# Patient Record
Sex: FEMALE | Race: BLACK OR AFRICAN AMERICAN | Employment: FULL TIME | URBAN - METROPOLITAN AREA
[De-identification: names, ages, dates, MRNs, and addresses within clinical notes are randomized per-mention and may not be internally consistent; named-entity substitution may affect disease eponyms.]

---

## 2017-02-02 ENCOUNTER — HOSPITAL ENCOUNTER (OUTPATIENT)
Dept: MRI IMAGING | Age: 52
Discharge: HOME | End: 2017-02-02
Attending: PHYSICAL MEDICINE & REHABILITATION
Payer: COMMERCIAL

## 2017-02-02 DIAGNOSIS — M54.17 LUMBOSACRAL NEURITIS: ICD-10-CM

## 2017-02-02 DIAGNOSIS — M54.16 LUMBAR RADICULOPATHY: ICD-10-CM

## 2017-02-02 PROCEDURE — 72148 MRI LUMBAR SPINE W/O DYE: CPT

## 2017-02-15 ENCOUNTER — PATIENT OUTREACH (OUTPATIENT)
Dept: FAMILY MEDICINE CLINIC | Age: 52
End: 2017-02-15

## 2017-02-15 NOTE — PROGRESS NOTES
This episode closed:NNT[facility]IP CJW 12/18-19/2016: Chest Pain. Patient attended appointment with PCP 12/28/2017. Patient attended appointment with Cardiologist and completed Holter monitor with Malissa Cuba MD. Patient does not have return appointment unless future problems. Completed potassium regimen. Patient will follow up with Dr. Jaime Quintanilla 3/17/2017. Patient continues with Physical Therapy and is improving ( no back pain). Patient feels like she can go to the gym again. Patient stated eating healthier. Patient will go to the [facility] and look for recipes. Patient denied no chest pain,SOB,unusual sensations. She noted one event of palpitations while on the Holter monitor. NN reviewed RED FLAGS with patient and she verbalized understanding when to seek immediate medical attention. Red Flags: Fever,chills,Nausea,Vomiting,Diarrhea, unable to take medications,pain,SOB,chest pain,unusual sensations. Results for Delta [place]an (MRN [id number]) as of 2/15/2017 15:15   Ref.  Range 12/28/2016 15:04   Sodium Latest Ref Range: 134 - 144 mmol/L 141   Potassium Latest Ref Range: 3.5 - 5.2 mmol/L 3.9   Chloride Latest Ref Range: 96 - 106 mmol/L 103   CO2 Latest Ref Range: 18 - 29 mmol/L 26   Glucose Latest Ref Range: 65 - 99 mg/dL 89   BUN Latest Ref Range: 6 - 24 mg/dL 10   Creatinine Latest Ref Range: 0.57 - 1.00 mg/dL 0.58   BUN/Creatinine ratio Latest Ref Range: 9 - 23  17   Calcium Latest Ref Range: 8.7 - 10.2 mg/dL 8.8   GFR est non-AA Latest Ref Range: >59 mL/min/1.73 108   GFR est AA Latest Ref Range: >59 mL/min/1.73 124

## 2017-02-15 NOTE — PATIENT INSTRUCTIONS
Dr. Rosalio Braun 3/17/2017. Heart-Healthy Diet: Care Instructions  Your Care Instructions    A heart-healthy diet has lots of vegetables, fruits, nuts, beans, and whole grains, and is low in salt. It limits foods that are high in saturated fat, such as meats, cheeses, and fried foods. It may be hard to change your diet, but even small changes can lower your risk of heart attack and heart disease. Follow-up care is a key part of your treatment and safety. Be sure to make and go to all appointments, and call your doctor if you are having problems. It's also a good idea to know your test results and keep a list of the medicines you take. How can you care for yourself at home? Watch your portions  · Learn what a serving is. A \"serving\" and a \"portion\" are not always the same thing. Make sure that you are not eating larger portions than are recommended. For example, a serving of pasta is ½ cup. A serving size of meat is 2 to 3 ounces. A 3-ounce serving is about the size of a deck of cards. Measure serving sizes until you are good at [place]" them. Keep in mind that restaurants often serve portions that are 2 or 3 times the size of one serving. · To keep your energy level up and keep you from feeling hungry, eat often but in smaller portions. · Eat only the number of calories you need to stay at a healthy weight. If you need to lose weight, eat fewer calories than your body burns (through exercise and other physical activity). Eat more fruits and vegetables  · Eat a variety of fruit and vegetables every day. Dark green, deep orange, red, or yellow fruits and vegetables are especially good for you. Examples include spinach, carrots, peaches, and berries. · Keep carrots, celery, and other veggies handy for snacks. Buy fruit that is in season and store it where you can see it so that you will be tempted to eat it. · Cook dishes that have a lot of veggies in them, such as stir-fries and soups.   Limit saturated and trans fat  · Read food labels, and try to avoid saturated and trans fats. They increase your risk of heart disease. Trans fat is found in many processed foods such as cookies and crackers. · Use olive or canola oil when you cook. Try cholesterol-lowering spreads, such as Benecol or Take Control. · Bake, broil, grill, or steam foods instead of frying them. · Choose lean meats instead of high-fat meats such as hot dogs and sausages. Cut off all visible fat when you prepare meat. · Eat fish, skinless poultry, and meat alternatives such as soy products instead of high-fat meats. Soy products, such as tofu, may be especially good for your heart. · Choose low-fat or fat-free milk and dairy products. Eat fish  · Eat at least two servings of fish a week. Certain fish, such as salmon and tuna, contain omega-3 fatty acids, which may help reduce your risk of heart attack. Eat foods high in fiber  · Eat a variety of grain products every day. Include whole-grain foods that have lots of fiber and nutrients. Examples of whole-grain foods include oats, whole wheat bread, and brown rice. · Buy whole-grain breads and cereals, instead of white bread or pastries. Limit salt and sodium  · Limit how much salt and sodium you eat to help lower your blood pressure. · Taste food before you salt it. Add only a little salt when you think you need it. With time, your taste buds will adjust to less salt. · Eat fewer snack items, fast foods, and other high-salt, processed foods. Check food labels for the amount of sodium in packaged foods. · Choose low-sodium versions of canned goods (such as soups, vegetables, and beans). Limit sugar  · Limit drinks and foods with added sugar. These include candy, desserts, and soda pop. Limit alcohol  · Limit alcohol to no more than 2 drinks a day for men and 1 drink a day for women. Too much alcohol can cause health problems. When should you call for help?   Watch closely for changes in your health, and be sure to contact your doctor if:  · You would like help planning heart-healthy meals. Where can you learn more? Go to http://zohreh-reynold.info/. Enter V137 in the search box to learn more about \"Heart-Healthy Diet: Care Instructions. \"  Current as of: January 27, 2016  Content Version: 11.1  © 8724-2231 [employer]. Care instructions adapted under license by [employer] (which disclaims liability or warranty for this information). If you have questions about a medical condition or this instruction, always ask your healthcare professional. [place] [address] any warranty or liability for your use of this information. Heart-Healthy Diet: Care Instructions  Your Care Instructions    A heart-healthy diet has lots of vegetables, fruits, nuts, beans, and whole grains, and is low in salt. It limits foods that are high in saturated fat, such as meats, cheeses, and fried foods. It may be hard to change your diet, but even small changes can lower your risk of heart attack and heart disease. Follow-up care is a key part of your treatment and safety. Be sure to make and go to all appointments, and call your doctor if you are having problems. It's also a good idea to know your test results and keep a list of the medicines you take. How can you care for yourself at home? Watch your portions  · Learn what a serving is. A \"serving\" and a \"portion\" are not always the same thing. Make sure that you are not eating larger portions than are recommended. For example, a serving of pasta is ½ cup. A serving size of meat is 2 to 3 ounces. A 3-ounce serving is about the size of a deck of cards. Measure serving sizes until you are good at [place]" them. Keep in mind that restaurants often serve portions that are 2 or 3 times the size of one serving. · To keep your energy level up and keep you from feeling hungry, eat often but in smaller portions.   · Eat only the number of calories you need to stay at a healthy weight. If you need to lose weight, eat fewer calories than your body burns (through exercise and other physical activity). Eat more fruits and vegetables  · Eat a variety of fruit and vegetables every day. Dark green, deep orange, red, or yellow fruits and vegetables are especially good for you. Examples include spinach, carrots, peaches, and berries. · Keep carrots, celery, and other veggies handy for snacks. Buy fruit that is in season and store it where you can see it so that you will be tempted to eat it. · Cook dishes that have a lot of veggies in them, such as stir-fries and soups. Limit saturated and trans fat  · Read food labels, and try to avoid saturated and trans fats. They increase your risk of heart disease. Trans fat is found in many processed foods such as cookies and crackers. · Use olive or canola oil when you cook. Try cholesterol-lowering spreads, such as Benecol or Take Control. · Bake, broil, grill, or steam foods instead of frying them. · Choose lean meats instead of high-fat meats such as hot dogs and sausages. Cut off all visible fat when you prepare meat. · Eat fish, skinless poultry, and meat alternatives such as soy products instead of high-fat meats. Soy products, such as tofu, may be especially good for your heart. · Choose low-fat or fat-free milk and dairy products. Eat fish  · Eat at least two servings of fish a week. Certain fish, such as salmon and tuna, contain omega-3 fatty acids, which may help reduce your risk of heart attack. Eat foods high in fiber  · Eat a variety of grain products every day. Include whole-grain foods that have lots of fiber and nutrients. Examples of whole-grain foods include oats, whole wheat bread, and brown rice. · Buy whole-grain breads and cereals, instead of white bread or pastries.   Limit salt and sodium  · Limit how much salt and sodium you eat to help lower your blood pressure. · Taste food before you salt it. Add only a little salt when you think you need it. With time, your taste buds will adjust to less salt. · Eat fewer snack items, fast foods, and other high-salt, processed foods. Check food labels for the amount of sodium in packaged foods. · Choose low-sodium versions of canned goods (such as soups, vegetables, and beans). Limit sugar  · Limit drinks and foods with added sugar. These include candy, desserts, and soda pop. Limit alcohol  · Limit alcohol to no more than 2 drinks a day for men and 1 drink a day for women. Too much alcohol can cause health problems. When should you call for help? Watch closely for changes in your health, and be sure to contact your doctor if:  · You would like help planning heart-healthy meals. Where can you learn more? Go to http://zohreh-reynold.info/. Enter V137 in the search box to learn more about \"Heart-Healthy Diet: Care Instructions. \"  Current as of: January 27, 2016  Content Version: 11.1  © 1695-3780 [employer]. Care instructions adapted under license by [employer] (which disclaims liability or warranty for this information). If you have questions about a medical condition or this instruction, always ask your healthcare professional. [address] any warranty or liability for your use of this information.

## 2017-04-12 ENCOUNTER — OFFICE VISIT (OUTPATIENT)
Dept: FAMILY MEDICINE CLINIC | Age: 52
End: 2017-04-12

## 2017-04-12 VITALS
OXYGEN SATURATION: 96 % | HEIGHT: 63 IN | TEMPERATURE: 98 F | BODY MASS INDEX: 40.93 KG/M2 | HEART RATE: 75 BPM | WEIGHT: 231 LBS | SYSTOLIC BLOOD PRESSURE: 107 MMHG | RESPIRATION RATE: 16 BRPM | DIASTOLIC BLOOD PRESSURE: 71 MMHG

## 2017-04-12 DIAGNOSIS — Z01.419 WELL WOMAN EXAM WITH ROUTINE GYNECOLOGICAL EXAM: Primary | ICD-10-CM

## 2017-04-12 DIAGNOSIS — I10 ESSENTIAL HYPERTENSION: ICD-10-CM

## 2017-04-12 DIAGNOSIS — R79.89 LOW VITAMIN D LEVEL: ICD-10-CM

## 2017-04-12 NOTE — PROGRESS NOTES
HPI:  Shima Easley is a 49 y.o. female presenting for well woman exam.     Last menstrual period was: doesn't have, has already went through menopause     Sexually active?: yes  Diet: doesn't eat like she should she admits  Exercise: none but has gym membership       Allergies- reviewed: Allergies   Allergen Reactions    Latex Itching    Atorvastatin Myalgia    Crestor [Rosuvastatin] Myalgia         Medications- reviewed:   Current Outpatient Prescriptions   Medication Sig    famotidine (PEPCID) 20 mg tablet Take 1 Tab by mouth nightly.  hydroCHLOROthiazide (HYDRODIURIL) 25 mg tablet TAKE ONE TABLET BY MOUTH ONCE DAILY    aspirin delayed-release 81 mg tablet Take  by mouth daily.  potassium chloride SA (MICRO-K) 10 mEq capsule Take 20 mEq by mouth daily.  fish oil-omega-3 fatty acids 340-1,000 mg capsule Take 1 Cap by mouth daily.  OTHER Liver rescue    digestive enzymes tab Take  by mouth. No current facility-administered medications for this visit. Past Medical History- reviewed:  Past Medical History:   Diagnosis Date    Hypertension          Past Surgical History- reviewed:   Past Surgical History:   Procedure Laterality Date    HX GYN      Laproscopy D &C         Family History - reviewed:  Family History   Problem Relation Age of Onset    Diabetes Mother     Heart Disease Father     Diabetes Sister     Heart Disease Sister     Stroke Sister     Stroke Brother          Social History - reviewed:  Social History     Social History    Marital status: SINGLE     Spouse name: N/A    Number of children: N/A    Years of education: N/A     Occupational History    Not on file.      Social History Main Topics    Smoking status: Never Smoker    Smokeless tobacco: Never Used    Alcohol use No    Drug use: No    Sexual activity: Not Currently     Other Topics Concern    Not on file     Social History Narrative         Immunizations - reviewed:   Immunization History Administered Date(s) Administered    Tdap 04/12/2017       Tdap: today       Health Maintenance reviewed -  Pap smear today  Mammogram today  Colonoscopy 5 years ago, thinks she was told to come back in 10 years   DEXA scan N/A  HIV testing desires  Hepatitis C testing desires      Review of Systems   CONSTITUTIONAL: Denies: fever, chills  EYES: decreased vision  ENT: Denies: sore throat, nasal congestion, nasal discharge  CARDIOVASCULAR: Denies: Denies: chest pain, dyspnea on exertion, palpitations  RESPIRATORY: Denies: shortness of breath, pleuritic chest pain  GI: Denies: nausea, vomiting, diarrhea  : Denies: dysuria, frequency/urgency  BREASTS: No masses or nipple discharge      Physical Exam  Visit Vitals    /71 (BP 1 Location: Left arm, BP Patient Position: Sitting)    Pulse 75    Temp 98 °F (36.7 °C) (Oral)    Resp 16    Ht 5' 2.5\" (1.588 m)    Wt 231 lb (104.8 kg)    SpO2 96%    BMI 41.58 kg/m2       General appearance - alert, well appearing, and in no distress  Eyes - pupils equal and reactive, extraocular eye movements intact  Ears - bilateral TM's and external ear canals normal  Nose - normal and patent, no erythema, discharge or polyps  Mouth - mucous membranes moist, pharynx normal without lesions  Neck - supple, no significant adenopathy  Chest - clear to auscultation, no wheezes, rales or rhonchi, symmetric air entry  Heart - normal rate, regular rhythm, normal S1, S2, no murmurs, rubs, clicks or gallops  Abdomen - soft, nontender, nondistended, no masses or organomegaly, limited by obesity   Neurological - alert, oriented, normal speech, no focal findings or movement disorder noted  Musculoskeletal - no joint tenderness, deformity or swelling  Extremities - peripheral pulses normal, no pedal edema, no clubbing or cyanosis  Skin - large black head R breast near cleavage line   Pelvic - Exam chaperoned by Uriel Nugent LPN. External genitalia normal without rashes or lesions.  Pink and moist vaginal mucosa. Scant white discharge. Cervix without lesions or abnormal discharge. Uterus non tender and normal size. No adnexal masses or tenderness. Bimanual limited by obesity. Breast - deferred      Assessment/Plan:    ICD-10-CM ICD-9-CM    1. Well woman exam with routine gynecological exam Z01.419 V72.31 PAP IG,CT-NG, APTIMA HPV AND RFX 16/18,45 ([id number])      HIV 1/2 AG/AB, 4TH GENERATION,W RFLX CONFIRM      HEPATITIS C AB      JODI MAMMO BI SCREENING INCL CAD      TETANUS, DIPHTHERIA TOXOIDS AND ACELLULAR PERTUSSIS VACCINE (TDAP), IN INDIVIDS. >=7, IM   2. Low vitamin D level E55.9 268.9 VITAMIN D, 25 HYDROXY   3. Essential hypertension R18 965.0 METABOLIC PANEL, BASIC      LIPID PANEL      MICROALBUMIN, UR, RAND W/ MICROALBUMIN/CREA RATIO         · Counseled re: diet, exercise, healthy lifestyle  · Appropriate labs, vaccines, imaging studies, and referrals ordered as listed above  · Mammogram ordered today. Breast exam deferred. The American Cancer Society recommends not performing clinical breast examination, given the potential for false-positive findings and lack of evidence for improved outcomes. The USPSTF states that evidence is insufficient to assess additional benefits of clinical breast examination beyond mammography. · Pt desires STD screen today, ordered     Follow-up Disposition: Not on File      I have discussed the diagnosis with the patient and the intended plan as seen in the above orders. The patient has received an after-visit summary and questions were answered concerning future plans. I have discussed medication side effects and warnings with the patient as well. Informed pt to return to the office if new symptoms arise.       Mary Ware, DO

## 2017-04-12 NOTE — PATIENT INSTRUCTIONS
Well Visit, Women 48 to 72: Care Instructions  Your Care Instructions  Physical exams can help you stay healthy. Your doctor has checked your overall health and may have suggested ways to take good care of yourself. He or she also may have recommended tests. At home, you can help prevent illness with healthy eating, regular exercise, and other steps. Follow-up care is a key part of your treatment and safety. Be sure to make and go to all appointments, and call your doctor if you are having problems. It's also a good idea to know your test results and keep a list of the medicines you take. How can you care for yourself at home? · Reach and stay at a healthy weight. This will lower your risk for many problems, such as obesity, diabetes, heart disease, and high blood pressure. · Get at least 30 minutes of exercise on most days of the week. Walking is a good choice. You also may want to do other activities, such as running, swimming, cycling, or playing tennis or team sports. · Do not smoke. Smoking can make health problems worse. If you need help quitting, talk to your doctor about stop-smoking programs and medicines. These can increase your chances of quitting for good. · Protect your skin from too much sun. When you're outdoors from 10 a.m. to 4 p.m., stay in the shade or cover up with clothing and a hat with a wide brim. Wear sunglasses that block UV rays. Even when it's cloudy, put broad-spectrum sunscreen (SPF 30 or higher) on any exposed skin. · See a dentist one or two times a year for checkups and to have your teeth cleaned. · Wear a seat belt in the car. · Limit alcohol to 1 drink a day. Too much alcohol can cause health problems. Follow your doctor's advice about when to have certain tests. These tests can spot problems early. · Cholesterol.  Your doctor will tell you how often to have this done based on your age, family history, or other things that can increase your risk for heart attack and stroke. · Blood pressure. Have your blood pressure checked during a routine doctor visit. Your doctor will tell you how often to check your blood pressure based on your age, your blood pressure results, and other factors. · Mammogram. Ask your doctor how often you should have a mammogram, which is an X-ray of your breasts. A mammogram can spot breast cancer before it can be felt and when it is easiest to treat. · Pap test and pelvic exam. Ask your doctor how often you should have a Pap test. You may not need to have a Pap test as often as you used to. · Vision. Have your eyes checked every year or two or as often as your doctor suggests. Some experts recommend that you have yearly exams for glaucoma and other age-related eye problems starting at age 48. · Hearing. Tell your doctor if you notice any change in your hearing. You can have tests to find out how well you hear. · Diabetes. Ask your doctor whether you should have tests for diabetes. · Colon cancer. You should begin tests for colon cancer at age 48. You may have one of several tests. Your doctor will tell you how often to have tests based on your age and risk. Risks include whether you already had a precancerous polyp removed from your colon or whether your parents, sisters and brothers, or children have had colon cancer. · Thyroid disease. Talk to your doctor about whether to have your thyroid checked as part of a regular physical exam. Women have an increased chance of a thyroid problem. · Osteoporosis. You should begin tests for bone density at age 72. If you are younger than 72, ask your doctor whether you have factors that may increase your risk for this disease. You may want to have this test before age 72. · Heart attack and stroke risk. At least every 4 to 6 years, you should have your risk for heart attack and stroke assessed.  Your doctor uses factors such as your age, blood pressure, cholesterol, and whether you smoke or have diabetes to show what your risk for a heart attack or stroke is over the next 10 years. When should you call for help? Watch closely for changes in your health, and be sure to contact your doctor if you have any problems or symptoms that concern you. Where can you learn more? Go to http://zohreh-reynold.info/. Enter Y478 in the search box to learn more about \"Well Visit, Women 50 to 72: Care Instructions. \"  Current as of: July 19, 2016  Content Version: 11.2  © 6564-9568 [employer]. Care instructions adapted under license by [employer] (which disclaims liability or warranty for this information). If you have questions about a medical condition or this instruction, always ask your healthcare professional. [address] any warranty or liability for your use of this information.

## 2017-04-12 NOTE — PROGRESS NOTES
Chief Complaint   Patient presents with    Well Woman     1. Have you been to the ER, urgent care clinic since your last visit? Hospitalized since your last visit? no    2. Have you seen or consulted any other health care providers outside of the [address] [place] [address] since your last visit? Include any pap smears or colon screening.  no

## 2017-04-12 NOTE — MR AVS SNAPSHOT
Visit Information Date & Time Provider Department Dept. Phone Encounter #  
 4/12/2017  2:00 PM Rebel Kimmie, Xavier [facility] [place] [phone] [id number] Upcoming Health Maintenance Date Due DTaP/Tdap/Td series (1 - Tdap) 5/17/1987 BREAST CANCER SCRN MAMMOGRAM 5/17/2016 FOBT Q 1 YEAR AGE 50-75 5/17/2016 INFLUENZA AGE 9 TO ADULT 8/1/2016 PAP AKA CERVICAL CYTOLOGY 3/10/2018 Allergies as of 4/12/2017  Review Complete On: 4/12/2017 By: Rebel Burks DO Severity Noted Reaction Type Reactions Latex  01/16/2015    Itching Atorvastatin  12/28/2016    Myalgia Crestor [Rosuvastatin]  12/28/2016    Myalgia Current Immunizations  Never Reviewed No immunizations on file. Not reviewed this visit You Were Diagnosed With   
  
 Codes Comments Low vitamin D level    -  Primary ICD-10-CM: E55.9 ICD-9-CM: 268.9 Essential hypertension     ICD-10-CM: I10 
ICD-9-CM: 401.9 Well woman exam with routine gynecological exam     ICD-10-CM: K58.890 ICD-9-CM: V72.31 Vitals BP Pulse Temp Resp Height(growth percentile) Weight(growth percentile) 107/71 (BP 1 Location: Left arm, BP Patient Position: Sitting) 75 98 °F (36.7 °C) (Oral) 16 5' 2.5\" (1.588 m) 231 lb (104.8 kg) SpO2 BMI OB Status Smoking Status 96% 41.58 kg/m2 Postmenopausal Never Smoker Vitals History BMI and BSA Data Body Mass Index Body Surface Area 41.58 kg/m 2 2.15 m 2 Preferred Pharmacy Pharmacy Name Phone [facility] [place] [phone] Your Updated Medication List  
  
   
This list is accurate as of: 4/12/17  3:00 PM.  Always use your most recent med list.  
  
  
  
  
 aspirin delayed-release 81 mg tablet Take  by mouth daily. digestive enzymes Tab Take  by mouth. famotidine 20 mg tablet Commonly known as:  PEPCID  
 Take 1 Tab by mouth nightly. fish oil-omega-3 fatty acids 340-1,000 mg capsule Take 1 Cap by mouth daily. hydroCHLOROthiazide 25 mg tablet Commonly known as:  HYDRODIURIL  
TAKE ONE TABLET BY MOUTH ONCE DAILY  
  
 OTHER Liver rescue  
  
 potassium chloride SA 10 mEq capsule Commonly known as:  Ermias Araceli Take 20 mEq by mouth daily. We Performed the Following HEPATITIS C AB [25613 CPT(R)] HIV 1/2 AG/AB, 4TH GENERATION,W RFLX CONFIRM [[id number] Custom] LIPID PANEL [96432 CPT(R)] METABOLIC PANEL, BASIC [62981 CPT(R)] MICROALBUMIN, UR, RAND W/ MICROALBUMIN/CREA RATIO [id number] CPT(R)] PAP IG,CT-NG, APTIMA HPV AND RFX 16/18,45 (789342,438510) [[id number] Custom] VITAMIN D, 25 HYDROXY [id number] CPT(R)] To-Do List   
 04/12/2017 Imaging:  [facility] MAMMO BI SCREENING INCL CAD Patient Instructions Well Visit, Women 48 to 72: Care Instructions Your Care Instructions Physical exams can help you stay healthy. Your doctor has checked your overall health and may have suggested ways to take good care of yourself. He or she also may have recommended tests. At home, you can help prevent illness with healthy eating, regular exercise, and other steps. Follow-up care is a key part of your treatment and safety. Be sure to make and go to all appointments, and call your doctor if you are having problems. It's also a good idea to know your test results and keep a list of the medicines you take. How can you care for yourself at home? · Reach and stay at a healthy weight. This will lower your risk for many problems, such as obesity, diabetes, heart disease, and high blood pressure. · Get at least 30 minutes of exercise on most days of the week. Walking is a good choice. You also may want to do other activities, such as running, swimming, cycling, or playing tennis or team sports. · Do not smoke. Smoking can make health problems worse.  If you need help quitting, talk to your doctor about stop-smoking programs and medicines. These can increase your chances of quitting for good. · Protect your skin from too much sun. When you're outdoors from 10 a.m. to 4 p.m., stay in the shade or cover up with clothing and a hat with a wide brim. Wear sunglasses that block UV rays. Even when it's cloudy, put broad-spectrum sunscreen (SPF 30 or higher) on any exposed skin. · See a dentist one or two times a year for checkups and to have your teeth cleaned. · Wear a seat belt in the car. · Limit alcohol to 1 drink a day. Too much alcohol can cause health problems. Follow your doctor's advice about when to have certain tests. These tests can spot problems early. · Cholesterol. Your doctor will tell you how often to have this done based on your age, family history, or other things that can increase your risk for heart attack and stroke. · Blood pressure. Have your blood pressure checked during a routine doctor visit. Your doctor will tell you how often to check your blood pressure based on your age, your blood pressure results, and other factors. · Mammogram. Ask your doctor how often you should have a mammogram, which is an X-ray of your breasts. A mammogram can spot breast cancer before it can be felt and when it is easiest to treat. · Pap test and pelvic exam. Ask your doctor how often you should have a Pap test. You may not need to have a Pap test as often as you used to. · Vision. Have your eyes checked every year or two or as often as your doctor suggests. Some experts recommend that you have yearly exams for glaucoma and other age-related eye problems starting at age 48. · Hearing. Tell your doctor if you notice any change in your hearing. You can have tests to find out how well you hear. · Diabetes. Ask your doctor whether you should have tests for diabetes. · Colon cancer. You should begin tests for colon cancer at age 48.  You may have one of several tests. Your doctor will tell you how often to have tests based on your age and risk. Risks include whether you already had a precancerous polyp removed from your colon or whether your parents, sisters and brothers, or children have had colon cancer. · Thyroid disease. Talk to your doctor about whether to have your thyroid checked as part of a regular physical exam. Women have an increased chance of a thyroid problem. · Osteoporosis. You should begin tests for bone density at age 72. If you are younger than 72, ask your doctor whether you have factors that may increase your risk for this disease. You may want to have this test before age 72. · Heart attack and stroke risk. At least every 4 to 6 years, you should have your risk for heart attack and stroke assessed. Your doctor uses factors such as your age, blood pressure, cholesterol, and whether you smoke or have diabetes to show what your risk for a heart attack or stroke is over the next 10 years. When should you call for help? Watch closely for changes in your health, and be sure to contact your doctor if you have any problems or symptoms that concern you. Where can you learn more? Go to http://zohreh-reynold.info/. Enter Y607 in the search box to learn more about \"Well Visit, Women 50 to 72: Care Instructions. \" Current as of: July 19, 2016 Content Version: 11.2 © 2510-6991 Healthwise, Incorporated. Care instructions adapted under license by [employer] (which disclaims liability or warranty for this information). If you have questions about a medical condition or this instruction, always ask your healthcare professional. [place] [address] any warranty or liability for your use of this information. Introducing [facility] & HEALTH SERVICES! Dear Kely Gauthier: 
Thank you for requesting a The Green Life Guides account. Our records indicate that you already have an active The [employer] Life Guides account.   You can access your account anytime at [email or web address] Did you know that you can access your hospital and ER discharge instructions at any time in Vuze? You can also review all of your test results from your hospital stay or ER visit. Additional Information If you have questions, please visit the Frequently Asked Questions section of the Vuze website at [email or web address] Remember, Vuze is NOT to be used for urgent needs. For medical emergencies, dial 911. Now available from your iPhone and Android! Please provide this summary of care documentation to your next provider. Your primary care clinician is listed as Lilliana Sotelo. If you have any questions after today's visit, please call [phone].

## 2017-04-13 LAB
25(OH)D3+25(OH)D2 SERPL-MCNC: 21.9 NG/ML (ref 30–100)
ALBUMIN/CREAT UR: 4.7 MG/G CREAT (ref 0–30)
BUN SERPL-MCNC: 19 MG/DL (ref 6–24)
BUN/CREAT SERPL: 23 (ref 9–23)
CALCIUM SERPL-MCNC: 9.1 MG/DL (ref 8.7–10.2)
CHLORIDE SERPL-SCNC: 101 MMOL/L (ref 96–106)
CHOLEST SERPL-MCNC: 285 MG/DL (ref 100–199)
CO2 SERPL-SCNC: 28 MMOL/L (ref 18–29)
CREAT SERPL-MCNC: 0.82 MG/DL (ref 0.57–1)
CREAT UR-MCNC: 171.1 MG/DL
GLUCOSE SERPL-MCNC: 70 MG/DL (ref 65–99)
HCV AB S/CO SERPL IA: <0.1 S/CO RATIO (ref 0–0.9)
HDLC SERPL-MCNC: 43 MG/DL
HIV 1+2 AB+HIV1 P24 AG SERPL QL IA: NON REACTIVE
INTERPRETATION, 910389: NORMAL
LDLC SERPL CALC-MCNC: 217 MG/DL (ref 0–99)
MICROALBUMIN UR-MCNC: 8 UG/ML
POTASSIUM SERPL-SCNC: 3.9 MMOL/L (ref 3.5–5.2)
SODIUM SERPL-SCNC: 141 MMOL/L (ref 134–144)
TRIGL SERPL-MCNC: 123 MG/DL (ref 0–149)
VLDLC SERPL CALC-MCNC: 25 MG/DL (ref 5–40)

## 2017-04-15 LAB
C TRACH RRNA CVX QL NAA+PROBE: NEGATIVE
CYTOLOGIST CVX/VAG CYTO: NORMAL
CYTOLOGY CVX/VAG DOC THIN PREP: NORMAL
DX ICD CODE: NORMAL
HPV I/H RISK 4 DNA CVX QL PROBE+SIG AMP: NEGATIVE
Lab: NORMAL
N GONORRHOEA RRNA CVX QL NAA+PROBE: NEGATIVE
OTHER STN SPEC: NORMAL
PATH REPORT.FINAL DX SPEC: NORMAL
STAT OF ADQ CVX/VAG CYTO-IMP: NORMAL

## 2017-04-25 ENCOUNTER — HOSPITAL ENCOUNTER (OUTPATIENT)
Dept: MAMMOGRAPHY | Age: 52
Discharge: HOME | End: 2017-04-25
Attending: FAMILY MEDICINE
Payer: COMMERCIAL

## 2017-04-25 DIAGNOSIS — Z01.419 WELL WOMAN EXAM WITH ROUTINE GYNECOLOGICAL EXAM: ICD-10-CM

## 2017-04-25 PROCEDURE — 77063 BREAST TOMOSYNTHESIS BI: CPT

## 2017-10-18 ENCOUNTER — OFFICE VISIT (OUTPATIENT)
Dept: FAMILY MEDICINE CLINIC | Age: 52
End: 2017-10-18

## 2017-10-18 VITALS
RESPIRATION RATE: 16 BRPM | OXYGEN SATURATION: 99 % | DIASTOLIC BLOOD PRESSURE: 73 MMHG | HEART RATE: 71 BPM | TEMPERATURE: 97.9 F | HEIGHT: 63 IN | BODY MASS INDEX: 39.87 KG/M2 | SYSTOLIC BLOOD PRESSURE: 113 MMHG | WEIGHT: 225 LBS

## 2017-10-18 DIAGNOSIS — R00.2 PALPITATIONS: ICD-10-CM

## 2017-10-18 DIAGNOSIS — K21.9 GASTROESOPHAGEAL REFLUX DISEASE WITHOUT ESOPHAGITIS: ICD-10-CM

## 2017-10-18 DIAGNOSIS — Z86.39 HISTORY OF VITAMIN D DEFICIENCY: ICD-10-CM

## 2017-10-18 DIAGNOSIS — H93.8X2 SENSATION OF FULLNESS IN LEFT EAR: ICD-10-CM

## 2017-10-18 DIAGNOSIS — I10 ESSENTIAL HYPERTENSION: Primary | ICD-10-CM

## 2017-10-18 RX ORDER — FAMOTIDINE 20 MG/1
20 TABLET, FILM COATED ORAL 2 TIMES DAILY
Qty: 60 TAB | Refills: 6 | Status: SHIPPED | OUTPATIENT
Start: 2017-10-18 | End: 2018-11-02

## 2017-10-18 RX ORDER — HYDROCHLOROTHIAZIDE 25 MG/1
TABLET ORAL
Qty: 90 TAB | Refills: 3 | Status: SHIPPED | OUTPATIENT
Start: 2017-10-18 | End: 2018-11-01 | Stop reason: SDUPTHER

## 2017-10-18 NOTE — MR AVS SNAPSHOT
Visit Information Date & Time Provider Department Dept. Phone Encounter #  
 10/18/2017  2:45 PM Ingrid Nguyen DO [address] [facility] [phone] [id number] Upcoming Health Maintenance Date Due FOBT Q 1 YEAR AGE 50-75 5/17/2016 INFLUENZA AGE 9 TO ADULT 8/1/2017 BREAST CANCER SCRN MAMMOGRAM 4/25/2019 PAP AKA CERVICAL CYTOLOGY 4/12/2020 DTaP/Tdap/Td series (2 - Td) 4/12/2027 Allergies as of 10/18/2017  Review Complete On: 10/18/2017 By: Ingrid Nguyen DO Severity Noted Reaction Type Reactions Latex  01/16/2015    Itching Atorvastatin  12/28/2016    Myalgia Crestor [Rosuvastatin]  12/28/2016    Myalgia Current Immunizations  Never Reviewed Name Date Tdap 4/12/2017 Not reviewed this visit You Were Diagnosed With   
  
 Codes Comments Essential hypertension    -  Primary ICD-10-CM: I10 
ICD-9-CM: 401.9 History of vitamin D deficiency     ICD-10-CM: Z86.39 
ICD-9-CM: V12.1 Gastroesophageal reflux disease without esophagitis     ICD-10-CM: K21.9 ICD-9-CM: 530.81 Palpitations     ICD-10-CM: R00.2 ICD-9-CM: 785.1 Sensation of fullness in left ear     ICD-10-CM: H93.8X2 ICD-9-CM: 388. 8 Vitals BP Pulse Temp Resp Height(growth percentile) Weight(growth percentile) 113/73 (BP 1 Location: Left arm, BP Patient Position: Sitting) 71 97.9 °F (36.6 °C) (Oral) 16 5' 2.5\" (1.588 m) 225 lb (102.1 kg) SpO2 BMI OB Status Smoking Status 99% 40.5 kg/m2 Postmenopausal Never Smoker Vitals History BMI and BSA Data Body Mass Index Body Surface Area 40.5 kg/m 2 2.12 m 2 Preferred Pharmacy Pharmacy Name Phone Delaney Hives [address] [facility] [phone] Your Updated Medication List  
  
   
This list is accurate as of: 10/18/17  4:36 PM.  Always use your most recent med list.  
  
  
  
  
 aspirin delayed-release 81 mg tablet Take  by mouth daily. famotidine 20 mg tablet Commonly known as:  PEPCID Take 1 Tab by mouth two (2) times a day. hydroCHLOROthiazide 25 mg tablet Commonly known as:  HYDRODIURIL  
TAKE ONE TABLET BY MOUTH ONCE DAILY Prescriptions Sent to Pharmacy Refills  
 hydroCHLOROthiazide (HYDRODIURIL) 25 mg tablet 3 Sig: TAKE ONE TABLET BY MOUTH ONCE DAILY Class: Normal  
 Pharmacy: [facility] [address] [place] [address] Ph #: [phone]  
 famotidine (PEPCID) 20 mg tablet 6 Sig: Take 1 Tab by mouth two (2) times a day. Class: Normal  
 Pharmacy: [facility] [place], [address] [facility] Ph #: [phone] Route: Oral  
  
We Performed the Following LIPID PANEL [32822 CPT(R)] METABOLIC PANEL, COMPREHENSIVE [07810 CPT(R)] VITAMIN D, 25 HYDROXY [id number] CPT(R)] Introducing [facility] & HEALTH SERVICES! Dear [facility]: 
Thank you for requesting a Hungry Local account. Our records indicate that you already have an active Hungry Local account. You can access your account anytime at [email or web address] Did you know that you can access your hospital and ER discharge instructions at any time in Hungry Local? You can also review all of your test results from your hospital stay or ER visit. Additional Information If you have questions, please visit the Frequently Asked Questions section of the Hungry Local website at [email or web address] Remember, Hungry Local is NOT to be used for urgent needs. For medical emergencies, dial 911. Now available from your iPhone and Android! Please provide this summary of care documentation to your next provider. Your primary care clinician is listed as Genora Lefort. If you have any questions after today's visit, please call [phone].

## 2017-10-18 NOTE — LETTER
NOTIFICATION RETURN TO WORK / SCHOOL 
 
10/18/2017 2:23 PM 
 
Ms. Sharlene Rodriguez [address] To Whom It May Concern: Sharlene Rodriguez is currently under the care of [address] [facility]. She will return to work on 10-. If there are questions or concerns please have the patient contact our office. Sincerely, Mary Ware, DO

## 2017-10-19 LAB
25(OH)D3+25(OH)D2 SERPL-MCNC: 22 NG/ML (ref 30–100)
ALBUMIN SERPL-MCNC: 4 G/DL (ref 3.5–5.5)
ALBUMIN/GLOB SERPL: 1.3 {RATIO} (ref 1.2–2.2)
ALP SERPL-CCNC: 62 IU/L (ref 39–117)
ALT SERPL-CCNC: 7 IU/L (ref 0–32)
AST SERPL-CCNC: 14 IU/L (ref 0–40)
BILIRUB SERPL-MCNC: <0.2 MG/DL (ref 0–1.2)
BUN SERPL-MCNC: 13 MG/DL (ref 6–24)
BUN/CREAT SERPL: 17 (ref 9–23)
CALCIUM SERPL-MCNC: 9.4 MG/DL (ref 8.7–10.2)
CHLORIDE SERPL-SCNC: 98 MMOL/L (ref 96–106)
CHOLEST SERPL-MCNC: 264 MG/DL (ref 100–199)
CO2 SERPL-SCNC: 26 MMOL/L (ref 18–29)
COMMENT, 011824: ABNORMAL
CREAT SERPL-MCNC: 0.77 MG/DL (ref 0.57–1)
GLOBULIN SER CALC-MCNC: 3.2 G/DL (ref 1.5–4.5)
GLUCOSE SERPL-MCNC: 98 MG/DL (ref 65–99)
HDLC SERPL-MCNC: 43 MG/DL
INTERPRETATION, 910389: NORMAL
LDLC SERPL CALC-MCNC: 195 MG/DL (ref 0–99)
POTASSIUM SERPL-SCNC: 3.9 MMOL/L (ref 3.5–5.2)
PROT SERPL-MCNC: 7.2 G/DL (ref 6–8.5)
SODIUM SERPL-SCNC: 138 MMOL/L (ref 134–144)
TRIGL SERPL-MCNC: 128 MG/DL (ref 0–149)
VLDLC SERPL CALC-MCNC: 26 MG/DL (ref 5–40)

## 2018-08-17 ENCOUNTER — OFFICE VISIT (OUTPATIENT)
Dept: FAMILY MEDICINE CLINIC | Age: 53
End: 2018-08-17

## 2018-08-17 VITALS
DIASTOLIC BLOOD PRESSURE: 86 MMHG | WEIGHT: 219.6 LBS | RESPIRATION RATE: 16 BRPM | OXYGEN SATURATION: 98 % | SYSTOLIC BLOOD PRESSURE: 142 MMHG | TEMPERATURE: 98.2 F | HEART RATE: 63 BPM | BODY MASS INDEX: 38.91 KG/M2 | HEIGHT: 63 IN

## 2018-08-17 DIAGNOSIS — L03.213 PRESEPTAL CELLULITIS OF LEFT EYE: ICD-10-CM

## 2018-08-17 DIAGNOSIS — H10.32 ACUTE CONJUNCTIVITIS OF LEFT EYE, UNSPECIFIED ACUTE CONJUNCTIVITIS TYPE: Primary | ICD-10-CM

## 2018-08-17 RX ORDER — BACITRACIN ZINC AND POLYMYXIN B SULFATE 500; 10000 [USP'U]/G; [USP'U]/G
OINTMENT OPHTHALMIC
COMMUNITY
Start: 2018-08-10 | End: 2018-10-19

## 2018-08-17 RX ORDER — SULFAMETHOXAZOLE AND TRIMETHOPRIM 800; 160 MG/1; MG/1
TABLET ORAL
COMMUNITY
Start: 2018-08-10 | End: 2018-11-02

## 2018-08-17 NOTE — LETTER
NOTIFICATION RETURN TO WORK / SCHOOL 
 
8/17/2018 10:54 AM 
 
Ms. Soni Childers [address] [place] [address] [facility] To Whom It May Concern: Soni Childers is currently under the care of [facility]. She will return to work/school on: 8/21/18 If there are questions or concerns please have the patient contact our office.  
 
 
 
Sincerely, 
 
 
Caroline Rawls MD

## 2018-08-17 NOTE — PROGRESS NOTES
History of Present Illness:     Chief Complaint   Patient presents with    Eye Pain     eye drainage     Pt is a 47y.o. year old female    Presents to clinic for left eye problem. Started with itching and redness on 8/6  Went to  and told she had a sty   Given Bacitracin eye ointment and Bactrim   Feels like something is in her eye   Eye is very itchy, feeling like something is back in eye  Eye pain with movement  Vision is decreased      Past Medical History:   Diagnosis Date    Hypertension          Current Outpatient Prescriptions on File Prior to Visit   Medication Sig Dispense Refill    hydroCHLOROthiazide (HYDRODIURIL) 25 mg tablet TAKE ONE TABLET BY MOUTH ONCE DAILY 90 Tab 3    famotidine (PEPCID) 20 mg tablet Take 1 Tab by mouth two (2) times a day. 60 Tab 6    aspirin delayed-release 81 mg tablet Take  by mouth daily. No current facility-administered medications on file prior to visit. Allergies: Allergies   Allergen Reactions    Latex Itching    Atorvastatin Myalgia    Crestor [Rosuvastatin] Myalgia    Statins-Hmg-Coa Reductase Inhibitors Myalgia         Review of Systems:  Denies fever, chills, sweats  +Itching all over body  +Blurry vision      Objective:     Vitals:    08/17/18 0942   BP: 142/86   Pulse: 63   Resp: 16   Temp: 98.2 °F (36.8 °C)   TempSrc: Oral   SpO2: 98%   Weight: 219 lb 9.6 oz (99.6 kg)   Height: 5' 2.5\" (1.588 m)       Physical Exam:  General appearance - alert, well appearing, and in no distress  Eyes - PERRLA bilaterally, EOM intact. +Injected sclera and conjunctiva in left eye. Conjunctiva on the left erythematous and slightly edematous. No abrasion appreciated on fluorescence eye exam.      Visual Acuity Screening    Right eye Left eye Both eyes   Without correction: 20/25 20/25 20/25   With correction:          Recent Labs:  No results found for this or any previous visit (from the past 12 hour(s)).       Assessment and Plan:   Pt is a 47 y.o. year old female,      ICD-10-CM ICD-9-CM    1. Acute conjunctivitis of left eye, unspecified acute conjunctivitis type H10.32 372.00 REFERRAL TO OPHTHALMOLOGY   2. Preseptal cellulitis of left eye L03.213 373.13 REFERRAL TO OPHTHALMOLOGY       Given no improvement with ophthalmic and oral antibiotics, will refer to [facility] for further evaluation. Appointment scheduled for Monday with [facility]  Continue Bactrim but would DC Bacitracin eye ointment given it is not working  Continue PRN artificial tears  Call [facility] or [facility] over weekend if interval worsening    Follow up PRN    Anderson Vincent MD      I have discussed the diagnosis with the patient and the intended plan as seen in the above orders. The patient has received an after-visit summary and questions were answered concerning future plans.

## 2018-08-17 NOTE — MR AVS SNAPSHOT
[address] 
[phone] Patient: Collette Logan MRN: [id number] :1965 Visit Information Date & Time Provider Department Dept. Phone Encounter #  
 2018  9:30 AM Omayra Kahn, [address] [facility] [phone] [id number] Upcoming Health Maintenance Date Due FOBT Q 1 YEAR AGE 50-75 2016 Influenza Age 5 to Adult 2018 BREAST CANCER SCRN MAMMOGRAM 2019 PAP AKA CERVICAL CYTOLOGY 2020 DTaP/Tdap/Td series (2 - Td) 2027 Allergies as of 2018  Review Complete On: 2018 By: Latonya Host Severity Noted Reaction Type Reactions Latex  2015    Itching Atorvastatin  2016    Myalgia Crestor [Rosuvastatin]  2016    Myalgia Statins-hmg-coa Reductase Inhibitors  2016    Myalgia Current Immunizations  Never Reviewed Name Date Tdap 2017 Not reviewed this visit You Were Diagnosed With   
  
 Codes Comments Acute conjunctivitis of left eye, unspecified acute conjunctivitis type    -  Primary ICD-10-CM: H10.32 
ICD-9-CM: 372.00 Preseptal cellulitis of left eye     ICD-10-CM: L03.213 ICD-9-CM: 373.13 Vitals BP Pulse Temp Resp Height(growth percentile) Weight(growth percentile) 142/86 (BP 1 Location: Right arm, BP Patient Position: Sitting) 63 98.2 °F (36.8 °C) (Oral) 16 5' 2.5\" (1.588 m) 219 lb 9.6 oz (99.6 kg) SpO2 BMI OB Status Smoking Status 98% 39.53 kg/m2 Postmenopausal Never Smoker Vitals History BMI and BSA Data Body Mass Index Body Surface Area  
 39.53 kg/m 2 2.1 m 2 Preferred Pharmacy Pharmacy Name Phone Jefry Duty [address] [facility] [phone] Your Updated Medication List  
  
   
This list is accurate as of 18 10:48 AM.  Always use your most recent med list.  
 aspirin delayed-release 81 mg tablet Take  by mouth daily. bacitracin-polymyxin b ophthalmic ointment Commonly known as:  POLYSPORIN  
  
 famotidine 20 mg tablet Commonly known as:  PEPCID Take 1 Tab by mouth two (2) times a day. hydroCHLOROthiazide 25 mg tablet Commonly known as:  HYDRODIURIL  
TAKE ONE TABLET BY MOUTH ONCE DAILY PROBIOTIC PLUS COLOSTRUM -50 mg Pwpk Generic drug:  Lactobac 42-Bifid 3-xazlae-SEY Take  by mouth. trimethoprim-sulfamethoxazole 160-800 mg per tablet Commonly known as:  BACTRIM DS, SEPTRA DS We Performed the Following REFERRAL TO OPHTHALMOLOGY [REF57 Custom] Referral Information Referral ID Referred By Referred To  
  
 [id number] Sylvia LIN Not Available Visits Status Start Date End Date 1 New Request 8/17/18 8/17/19 If your referral has a status of pending review or denied, additional information will be sent to support the outcome of this decision. Patient Instructions [facility] [address], [address] appointment set for 8/20/18 at 9:00 AM. If you need to cancel appointment please call [phone] to avoid a $50.00 cancellation fee. Bring your ID and insurance card to your appointment. Arrive 15-30 minutes prior to appointment. Introducing [facility] & HEALTH SERVICES! Dear Vilma Parikh: 
Thank you for requesting a RockBee account. Our records indicate that you already have an active RockBee account. You can access your account anytime at [email or web address] Did you know that you can access your hospital and ER discharge instructions at any time in RockBee? You can also review all of your test results from your hospital stay or ER visit. Additional Information If you have questions, please visit the Frequently Asked Questions section of the RockBee website at [email or web address] Remember, [employer] is NOT to be used for urgent needs. For medical emergencies, dial 911. Now available from your iPhone and Android! Please provide this summary of care documentation to your next provider. Your primary care clinician is listed as Elijah Junior. If you have any questions after today's visit, please call [phone].

## 2018-08-17 NOTE — PATIENT INSTRUCTIONS
[facility] [address], [place], [address] [place] [address] appointment set for 8/20/18 at 9:00 AM. If you need to cancel appointment please call [phone] to avoid a $50.00 cancellation fee. Bring your ID and insurance card to your appointment. Arrive 15-30 minutes prior to appointment.

## 2018-08-17 NOTE — PROGRESS NOTES
Chief Complaint   Patient presents with    Eye Pain     eye drainage     1. Have you been to the ER, urgent care clinic since your last visit? Hospitalized since your last visit? Yes When: 8/13/18 Where: [facility] Reason for visit: eye pain    2. Have you seen or consulted any other health care providers outside of the [address] [place] [address] since your last visit? Include any pap smears or colon screening.  No

## 2018-10-19 ENCOUNTER — OFFICE VISIT (OUTPATIENT)
Dept: FAMILY MEDICINE CLINIC | Age: 53
End: 2018-10-19

## 2018-10-19 VITALS
OXYGEN SATURATION: 100 % | SYSTOLIC BLOOD PRESSURE: 129 MMHG | HEART RATE: 68 BPM | RESPIRATION RATE: 18 BRPM | BODY MASS INDEX: 39.23 KG/M2 | TEMPERATURE: 98.4 F | WEIGHT: 221.4 LBS | HEIGHT: 63 IN | DIASTOLIC BLOOD PRESSURE: 85 MMHG

## 2018-10-19 DIAGNOSIS — G43.C0 PERIODIC HEADACHE SYNDROME, NOT INTRACTABLE: Primary | ICD-10-CM

## 2018-10-19 DIAGNOSIS — E66.9 OBESITY (BMI 30-39.9): ICD-10-CM

## 2018-10-19 DIAGNOSIS — I73.00 RAYNAUD'S PHENOMENON WITHOUT GANGRENE: ICD-10-CM

## 2018-10-19 DIAGNOSIS — I10 ESSENTIAL HYPERTENSION: ICD-10-CM

## 2018-10-19 RX ORDER — BUTALBITAL AND ACETAMINOPHEN 325; 50 MG/1; MG/1
2 TABLET ORAL
COMMUNITY
End: 2018-11-02

## 2018-10-19 NOTE — PROGRESS NOTES
Chief Complaint   Patient presents with    Elevated Blood Pressure     x two and half weeks    Other     cold fingers, pale fingertips     1. Have you been to the ER, urgent care clinic since your last visit? Hospitalized since your last visit? Yes When: 10/3/18 Where: [facility]  Reason for visit: elevated Blood pressure    2. Have you seen or consulted any other health care providers outside of the [address] [place] [address] since your last visit? Include any pap smears or colon screening.  No

## 2018-10-19 NOTE — LETTER
NOTIFICATION RETURN TO WORK / SCHOOL 
 
10/19/2018 8:19 PM 
 
Ms. Maureen Garner [address] To Whom It May Concern: Maureen Garner is currently under the care of [address] on 10/19/18 She will return to work/school on: 10/24/18 If there are questions or concerns please have the patient contact our office.  
 
 
 
Sincerely, 
 
 
Ewelina Rosenthal MD

## 2018-10-19 NOTE — PROGRESS NOTES
History of Present Illness:     Chief Complaint   Patient presents with    Elevated Blood Pressure     x two and half weeks    Other     cold fingers, pale fingertips     Pt is a 47y.o. year old female    Presents to clinic for concern for elevated blood pressure. Sent home from work for elevated BP day prior  Worried that her HA was causing her pressure to go up  Had to go to ED 2 weeks ago for BP of 190/100  Today at work it was 170/120  Tends to have elevated blood pressures when she has headaches    Headaches/migraines are sporatic  Had been almost a year up until about 2 weeks ago  Takes Excedrine migraine which typically gives relief  HA is temporal, behind the eyes, sharp   Sensitive to light  Associated nausea and blurry or double vision or a shadow   Tried Topamax but did not tolerate     Home BPs 120-140s/70-80s  Taking HCTZ 25mg daily    Minimal cold makes the tips of her fingers turn white  Showed picture of occurrence  Also happens in her toes  Most notable when she is in the freezer section of store    Past Medical History:   Diagnosis Date    Hypertension          Current Outpatient Medications on File Prior to Visit   Medication Sig Dispense Refill    butalbital-acetaminophen (PHRENILIN)  mg tablet Take 2 Tabs by mouth every six (6) hours as needed (Take 1-2 tab by mouth as needed for pain).  aspirin-acetaminophen-caffeine (EXCEDRIN ES) 250-250-65 mg per tablet Take 1 Tab by mouth.  Lactobac 42-Bifid 9-kvpdvw-UMB (PROBIOTIC PLUS COLOSTRUM) -50 mg pwpk Take  by mouth.  hydroCHLOROthiazide (HYDRODIURIL) 25 mg tablet TAKE ONE TABLET BY MOUTH ONCE DAILY 90 Tab 3    trimethoprim-sulfamethoxazole (BACTRIM DS, SEPTRA DS) 160-800 mg per tablet       famotidine (PEPCID) 20 mg tablet Take 1 Tab by mouth two (2) times a day. 60 Tab 6    aspirin delayed-release 81 mg tablet Take  by mouth daily. No current facility-administered medications on file prior to visit. Allergies: Allergies   Allergen Reactions    Latex Itching    Atorvastatin Myalgia    Bacitracin-Polymyxin B Swelling     Eye swelling    Crestor [Rosuvastatin] Myalgia    Statins-Hmg-Coa Reductase Inhibitors Myalgia         Review of Systems:  Denies chest pain, RAM, palpitations, LE edema  Denies cough, sputum production, SOB, pleuritic chest pain, wheezing  Denies numbness/ tingling/ weakness in extremities    Objective:     Vitals:    10/19/18 1932   BP: 129/85   Pulse: 68   Resp: 18   Temp: 98.4 °F (36.9 °C)   TempSrc: Oral   SpO2: 100%   Weight: 221 lb 6.4 oz (100.4 kg)   Height: 5' 2.5\" (1.588 m)       Physical Exam:  General appearance - alert, well appearing, and in no distress and overweight  Eyes - pupils equal and reactive, extraocular eye movements intact  Chest - clear to auscultation, no wheezes, rales or rhonchi, symmetric air entry  Heart - normal rate, regular rhythm, normal S1, S2, no murmurs, rubs, clicks or gallops      Recent Labs:  No results found for this or any previous visit (from the past 12 hour(s)). Assessment and Plan:   Pt is a 47y.o. year old female,      ICD-10-CM ICD-9-CM    1. Periodic headache syndrome, not intractable G43. C0 346.20 REFERRAL TO NEUROLOGY   2. Raynaud's phenomenon without gangrene I73.00 443.0    3. Essential hypertension I10 401.9      BP stable in office  Difficult to decifer if HA is cause or effect of elevated BP  Instructed pt to keep BP/HA log    Referred to Neurology for further management of migraines  Sub optimally controlled at this point    Raynaud's phenomenon   Not present on exam but pt had cell phone picture  Etiology (idiopathic vs other causes) unclear at this point  Needs to return for labs    Follow up for wellness visit with labs  Refer to weight loss clinic per patient request    Antonia Cano MD      I have discussed the diagnosis with the patient and the intended plan as seen in the above orders.   The patient has received an after-visit summary and questions were answered concerning future plans.

## 2018-10-20 NOTE — PATIENT INSTRUCTIONS
Migraine Headache: Care Instructions  Your Care Instructions  Migraines are painful, throbbing headaches that often start on one side of the head. They may cause nausea and vomiting and make you sensitive to light, sound, or smell. Without treatment, migraines can last from 4 hours to a few days. Medicines can help prevent migraines or stop them after they have started. Your doctor can help you find which ones work best for you. Follow-up care is a key part of your treatment and safety. Be sure to make and go to all appointments, and call your doctor if you are having problems. It's also a good idea to know your test results and keep a list of the medicines you take. How can you care for yourself at home? · Do not drive if you have taken a prescription pain medicine. · Rest in a quiet, dark room until your headache is gone. Close your eyes, and try to relax or go to sleep. Don't watch TV or read. · Put a cold, moist cloth or cold pack on the painful area for 10 to 20 minutes at a time. Put a thin cloth between the cold pack and your skin. · Use a warm, moist towel or a heating pad set on low to relax tight shoulder and neck muscles. · Have someone gently massage your neck and shoulders. · Take your medicines exactly as prescribed. Call your doctor if you think you are having a problem with your medicine. You will get more details on the specific medicines your doctor prescribes. · Be careful not to take pain medicine more often than the instructions allow. You could get worse or more frequent headaches when the medicine wears off. To prevent migraines  · Keep a headache diary so you can figure out what triggers your headaches. Avoiding triggers may help you prevent headaches. Record when each headache began, how long it lasted, and what the pain was like.  (Was it throbbing, aching, stabbing, or dull?) Write down any other symptoms you had with the headache, such as nausea, flashing lights or dark spots, or sensitivity to bright light or loud noise. Note if the headache occurred near your period. List anything that might have triggered the headache. Triggers may include certain foods (chocolate, cheese, wine) or odors, smoke, bright light, stress, or lack of sleep. · If your doctor has prescribed medicine for your migraines, take it as directed. You may have medicine that you take only when you get a migraine and medicine that you take all the time to help prevent migraines. ? If your doctor has prescribed medicine for when you get a headache, take it at the first sign of a migraine, unless your doctor has given you other instructions. ? If your doctor has prescribed medicine to prevent migraines, take it exactly as prescribed. Call your doctor if you think you are having a problem with your medicine. · Find healthy ways to deal with stress. Migraines are most common during or right after stressful times. Take time to relax before and after you do something that has caused a migraine in the past.  · Try to keep your muscles relaxed by keeping good posture. Check your jaw, face, neck, and shoulder muscles for tension. Try to relax them. When you sit at a desk, change positions often. And make sure to stretch for 30 seconds each hour. · Get plenty of sleep and exercise. · Eat meals on a regular schedule. Avoid foods and drinks that often trigger migraines. These include chocolate, alcohol (especially red wine and port), aspartame, monosodium glutamate (MSG), and some additives found in foods (such as hot dogs, acosta, cold cuts, aged cheeses, and pickled foods). · Limit caffeine. Don't drink too much coffee, tea, or soda. But don't quit caffeine suddenly. That can also give you migraines. · Do not smoke or allow others to smoke around you. If you need help quitting, talk to your doctor about stop-smoking programs and medicines. These can increase your chances of quitting for good.   · If you are taking birth control pills or hormone therapy, talk to your doctor about whether they are triggering your migraines. When should you call for help? Call 911 anytime you think you may need emergency care. For example, call if:    · You have signs of a stroke. These may include:  ? Sudden numbness, paralysis, or weakness in your face, arm, or leg, especially on only one side of your body. ? Sudden vision changes. ? Sudden trouble speaking. ? Sudden confusion or trouble understanding simple statements. ? Sudden problems with walking or balance. ? A sudden, severe headache that is different from past headaches.    Call your doctor now or seek immediate medical care if:    · You have new or worse nausea and vomiting.     · You have a new or higher fever.     · Your headache gets much worse.    Watch closely for changes in your health, and be sure to contact your doctor if:    · You are not getting better after 2 days (48 hours). Where can you learn more? Go to http://zohreh-reynold.info/. Enter K150 in the search box to learn more about \"Migraine Headache: Care Instructions. \"  Current as of: June 4, 2018  Content Version: 11.8  © 0301-5059 [employer]. Care instructions adapted under license by [employer] (which disclaims liability or warranty for this information). If you have questions about a medical condition or this instruction, always ask your healthcare professional. [address] any warranty or liability for your use of this information. Raynaud's: Care Instructions  Your Care Instructions  Raynaud's is a condition that causes your hands and feet to overreact to cold. They may become painful and numb, and they can change colors, becoming very pale and then blue. This condition also is called Raynaud's phenomenon. There are two kinds of Raynaud's.  Primary Raynaud's, also known as Raynaud's disease, happens by itself and is the most common form. Secondary Raynaud's, also called Raynaud's syndrome, happens as part of another disease. In Raynaud's, the small vessels that bring blood to the skin either become narrow, or constrict for a short period of time. This limits blood flow to the hands and feet and sometimes to the nose or ears. Your hands and feet feel cold and numb and then turn very pale. As blood flow returns, your fingers and toes may turn red, and begin to throb and hurt. Raynaud's can be painful and annoying, but it usually does not cause serious problems. You can take simple steps to protect your hands and feet from the cold. If you have a bad case of Raynaud's and you cannot keep your hands and feet warm enough, your doctor may prescribe medicine. Follow-up care is a key part of your treatment and safety. Be sure to make and go to all appointments, and call your doctor if you are having problems. It's also a good idea to know your test results and keep a list of the medicines you take. How can you care for yourself at home? To prevent Raynaud's episodes or ease symptoms  · Run warm water over your hands or feet to increase blood flow. Use another part of your body, such as your forearm, to make sure the water is not too hot; you could burn your hands or feet and not feel it because they are numb. · Swing your arms in a [place] at the sides of your body (\"windmilling\") to increase blood flow. · If your doctor prescribes medicine to help Raynaud's, take it exactly as prescribed. Call your doctor if you think you are having a problem with your medicine. · If another condition causes your Raynaud's, make sure to follow your treatment for that condition. · Wear mittens or gloves when it is cold outside. Mittens are warmer than gloves because they keep your fingers together. Gloves underneath mittens will keep your hands warmer than gloves alone.  You also can use pot holders or oven mitts when getting something from the freezer or refrigerator. · You can slip chemical hand warmers into your mittens or gloves when you do outside activities. · Do not smoke. Nicotine makes blood vessels constrict, which can bring on an attack. If you need help quitting, talk to your doctor about stop-smoking programs and medicines. These can increase your chances of quitting for good. · Avoid caffeine and cold medicines that have pseudoephedrine. They make blood vessels constrict. Beta-blocker medicines, often used to treat high blood pressure, also can make Raynaud's worse. · Drink plenty of fluids to prevent dehydration, which can lower the amount of blood moving through the blood vessels. If you have kidney, heart, or liver disease and have to limit fluids, talk with your doctor before you increase the amount of fluids you drink. · Try to stay calm when you are under stress. Anxiety can make your blood vessels constrict and lead to a Raynaud's attack. To keep your whole body warm  · Eat a hot meal and drink a warm liquid before going outside. They may help raise your body temperature. · Wear layers of warm clothing. The inner layer should be made of a material such as polypropylene that pulls moisture away from your body. · Wear a hat. · Do not wear clothing that is too tight. It can decrease or cut off blood flow. · Try to stay dry. Choose waterproof, breathable jackets and boots. Being wet makes you more likely to become chilled. When should you call for help? Call your doctor now or seek immediate medical care if:    · You have severe pain in your hands or feet.     · Normal color does not return to your hands or feet.     · Your hands or feet do not warm up even after home care.    Watch closely for changes in your health, and be sure to contact your doctor if you have any problems. Where can you learn more? Go to http://zohreh-reynold.info/.   Enter P043 in the search box to learn more about \"Raynaud's: Care Instructions. \"  Current as of: June 11, 2018  Content Version: 11.8  © 9295-4588 Healthwise, [facility]. Care instructions adapted under license by [employer] (which disclaims liability or warranty for this information). If you have questions about a medical condition or this instruction, always ask your healthcare professional. [place] [address] any warranty or liability for your use of this information.

## 2018-11-01 DIAGNOSIS — I10 ESSENTIAL HYPERTENSION: ICD-10-CM

## 2018-11-02 ENCOUNTER — TELEPHONE (OUTPATIENT)
Dept: FAMILY MEDICINE CLINIC | Age: 53
End: 2018-11-02

## 2018-11-02 ENCOUNTER — OFFICE VISIT (OUTPATIENT)
Dept: FAMILY MEDICINE CLINIC | Age: 53
End: 2018-11-02

## 2018-11-02 VITALS
HEIGHT: 63 IN | TEMPERATURE: 98.3 F | SYSTOLIC BLOOD PRESSURE: 116 MMHG | DIASTOLIC BLOOD PRESSURE: 76 MMHG | RESPIRATION RATE: 18 BRPM | BODY MASS INDEX: 39.51 KG/M2 | WEIGHT: 223 LBS | HEART RATE: 65 BPM | OXYGEN SATURATION: 95 %

## 2018-11-02 DIAGNOSIS — Z28.21 REFUSED INFLUENZA VACCINE: ICD-10-CM

## 2018-11-02 DIAGNOSIS — R06.83 SNORING: ICD-10-CM

## 2018-11-02 DIAGNOSIS — R40.0 DAYTIME SLEEPINESS: ICD-10-CM

## 2018-11-02 DIAGNOSIS — G44.221 CHRONIC TENSION-TYPE HEADACHE, INTRACTABLE: ICD-10-CM

## 2018-11-02 DIAGNOSIS — I10 ESSENTIAL HYPERTENSION: Primary | ICD-10-CM

## 2018-11-02 DIAGNOSIS — E66.01 MORBID OBESITY (HCC): ICD-10-CM

## 2018-11-02 DIAGNOSIS — I10 HYPERTENSION, ESSENTIAL: ICD-10-CM

## 2018-11-02 RX ORDER — HYDROCHLOROTHIAZIDE 25 MG/1
TABLET ORAL
Qty: 90 TAB | Refills: 1 | Status: CANCELLED | OUTPATIENT
Start: 2018-11-02

## 2018-11-02 RX ORDER — HYDROCHLOROTHIAZIDE 25 MG/1
TABLET ORAL
Qty: 90 TAB | Refills: 1 | Status: SHIPPED | OUTPATIENT
Start: 2018-11-02 | End: 2019-07-05 | Stop reason: SDUPTHER

## 2018-11-02 NOTE — PROGRESS NOTES
History of Present Illness: Chief Complaint Patient presents with  Blood Pressure Check Pt is a 47y.o. year old female Presents to clinic for BP check. Home BPs running: 
- 133/88 
- 166/94 
- 147/101 
- 148/97 
- 159/97 Still complaining of HAs. They have decreased. Needed to take Excedrin 3 x last week. However, over the past month only needed it 1-2 times. Sleep study in 2015 that was normal. She does snore, reports AM headaches, feels very tired all day. Nods off behind the wheel. Recently stopped taking \"Recharge\", a supplement with 185mg of caffeine daily, taking them for about 1 year. Stopped about 1 month ago. Does not drink caffeine. Reports that she cannot sleep. Tries Melatonin without relief. Feels very tired the next day. Last eye check was last month. Started orientation class for supervised weight loss. Past Medical History:  
Diagnosis Date  Hypertension Current Outpatient Medications on File Prior to Visit Medication Sig Dispense Refill  hydroCHLOROthiazide (HYDRODIURIL) 25 mg tablet TAKE ONE TABLET BY MOUTH DAILY 90 Tab 1  
 aspirin-acetaminophen-caffeine (EXCEDRIN ES) 250-250-65 mg per tablet Take 1 Tab by mouth.  Lactobac 42-Bifid 6-gaokyt-KBC (PROBIOTIC PLUS COLOSTRUM) -50 mg pwpk Take  by mouth. No current facility-administered medications on file prior to visit. Allergies: Allergies Allergen Reactions  Latex Itching  Atorvastatin Myalgia  Bacitracin-Polymyxin B Swelling Eye swelling  Crestor [Rosuvastatin] Myalgia  Statins-Hmg-Coa Reductase Inhibitors Myalgia Review of Systems: 
Denies chest pain, RAM, palpitations, LE edema Objective:  
 
Vitals:  
 11/02/18 1356 BP: 116/76 Pulse: 65 Resp: 18 Temp: 98.3 °F (36.8 °C) TempSrc: Oral  
SpO2: 95% Weight: 223 lb (101.2 kg) Height: 5' 2.5\" (1.588 m) Physical Exam: General appearance - alert, well appearing, and in no distress Chest - clear to auscultation, no wheezes, rales or rhonchi, symmetric air entry Heart - normal rate, regular rhythm, normal S1, S2, no murmurs, rubs, clicks or gallops Extremities - peripheral pulses normal, trace pedal edema, no clubbing or cyanosis Recent Labs: 
No results found for this or any previous visit (from the past 12 hour(s)). Assessment and Plan:  
Pt is a 47y.o. year old female, 
 
  ICD-10-CM ICD-9-CM 1. Essential hypertension V21 255.6 METABOLIC PANEL, COMPREHENSIVE  
   CBC W/O DIFF  
   HEMOGLOBIN A1C WITH EAG 2. Chronic tension-type headache, intractable G44.221 339.12   
3. Daytime sleepiness R40.0 780.54 SLEEP MEDICINE REFERRAL 4. Snoring R06.83 786.09 SLEEP MEDICINE REFERRAL 5. BMI 40.0-44.9, adult ([facility]) Z68.41 V85.41 HEMOGLOBIN A1C WITH EAG 6. Morbid obesity ([facility] [address] E66.01 278.01   
7. Hypertension, essential I10 401.9 8. Refused influenza vaccine Z28.21 V64.06 Daytime sleepiness/ snoring/HA: 
Referred for sleep study Last done in 2015 HTN: 
Continue HCTZ Discussed home BP checks Recommended getting larger BP cuff At goal today in office Morbid obesity: 
Starting physician supervised weight loss program 
 
Check labs today Refused flu vaccine Follow up for well woman visit Jennifer Blackwood MD 
 
 
I have discussed the diagnosis with the patient and the intended plan as seen in the above orders. The patient has received an after-visit summary and questions were answered concerning future plans.

## 2018-11-02 NOTE — PATIENT INSTRUCTIONS
Home Blood Pressure Test: About This Test 
What is it? A home blood pressure test allows you to keep track of your blood pressure at home. Blood pressure is a measure of the force of blood against the walls of your arteries. Blood pressure readings include two numbers, such as 130/80 (say \"130 over 80\"). The first number is the systolic pressure. The second number is the diastolic pressure. Why is this test done? You may do this test at home to: · Find out if you have high blood pressure. · Track your blood pressure if you have high blood pressure. · Track how well medicine is working to reduce high blood pressure. · Check how lifestyle changes, such as weight loss and exercise, are affecting blood pressure. How can you prepare for the test? 
· Do not use caffeine, tobacco, or medicines known to raise blood pressure (such as nasal decongestant sprays) for at least 30 minutes before taking your blood pressure. · Do not exercise for at least 30 minutes before taking your blood pressure. What happens before the test? 
Take your blood pressure while you feel comfortable and relaxed. Sit quietly with both feet on the floor for at least 5 minutes before the test. 
What happens during the test? 
· Sit with your arm slightly bent and resting on a table so that your upper arm is at the same level as your heart. · Roll up your sleeve or take off your shirt to expose your upper arm. · Wrap the blood pressure cuff around your upper arm so that the lower edge of the cuff is about 1 inch above the bend of your elbow. Proceed with the following steps depending on if you are using an automatic or manual pressure monitor. Automatic blood pressure monitors · Press the on/off button on the automatic monitor and wait until the ready-to-measure \"heart\" symbol appears next to zero in the display window. · Press the start button. The cuff will inflate and deflate by itself. · Your blood pressure numbers will appear on the screen. · Write your numbers in your log book, along with the date and time. Manual blood pressure monitors · Place the earpieces of a stethoscope in your ears, and place the bell of the stethoscope over the artery, just below the cuff. · Close the valve on the rubber inflating bulb. · Squeeze the bulb rapidly with your opposite hand to inflate the cuff until the dial or column of mercury reads about 30 mm Hg higher than your usual systolic pressure. If you do not know your usual pressure, inflate the cuff to 210 mm Hg or until the pulse at your wrist disappears. · Open the pressure valve just slightly by twisting or pressing the valve on the bulb. · As you watch the pressure slowly fall, note the level on the dial at which you first start to hear a pulsing or tapping sound through the stethoscope. This is your systolic blood pressure. · Continue letting the air out slowly. The sounds will become muffled and will finally disappear. Note the pressure when the sounds completely disappear. This is your diastolic blood pressure. Let out all the remaining air. · Write your numbers in your log book, along with the date and time. What else should you know about the test? 
Here are the categories of blood pressure for adults: 
Ideal blood pressure. Systolic is less than 377, and diastolic is less than 80. Elevated blood pressure. Systolic is 007 to 523, and diastolic is less than 80. High blood pressure (hypertension). Systolic is 123 or above. Diastolic is 80 or above. One or both numbers may be high. It is more accurate to take the average of several readings made throughout the day than to rely on a single reading. Follow-up care is a key part of your treatment and safety. Be sure to make and go to all appointments, and call your doctor if you are having problems. It's also a good idea to keep a list of the medicines you take. Where can you learn more? Go to http://zohreh-reynold.info/. Enter C427 in the search box to learn more about \"Home Blood Pressure Test: About This Test.\" Current as of: December 6, 2017 Content Version: 11.8 © 9642-0813 Healthwise[employer]. Care instructions adapted under license by [employer] (which disclaims liability or warranty for this information). If you have questions about a medical condition or this instruction, always ask your healthcare professional. [address] any warranty or liability for your use of this information.

## 2018-11-02 NOTE — PROGRESS NOTES
Chief Complaint Patient presents with  Blood Pressure Check 1. Have you been to the ER, urgent care clinic since your last visit? Hospitalized since your last visit? No 
 
2. Have you seen or consulted any other health care providers outside of the [address] [place] [address] since your last visit? Include any pap smears or colon screening. No 
 
Patient refused Influenza vaccination during this visit.

## 2018-11-03 LAB
ALBUMIN SERPL-MCNC: 4.2 G/DL (ref 3.5–5.5)
ALBUMIN/GLOB SERPL: 1.4 {RATIO} (ref 1.2–2.2)
ALP SERPL-CCNC: 65 IU/L (ref 39–117)
ALT SERPL-CCNC: 7 IU/L (ref 0–32)
AST SERPL-CCNC: 12 IU/L (ref 0–40)
BILIRUB SERPL-MCNC: <0.2 MG/DL (ref 0–1.2)
BUN SERPL-MCNC: 15 MG/DL (ref 6–24)
BUN/CREAT SERPL: 19 (ref 9–23)
CALCIUM SERPL-MCNC: 9.4 MG/DL (ref 8.7–10.2)
CHLORIDE SERPL-SCNC: 98 MMOL/L (ref 96–106)
CO2 SERPL-SCNC: 28 MMOL/L (ref 20–29)
CREAT SERPL-MCNC: 0.77 MG/DL (ref 0.57–1)
ERYTHROCYTE [DISTWIDTH] IN BLOOD BY AUTOMATED COUNT: 15.3 % (ref 12.3–15.4)
EST. AVERAGE GLUCOSE BLD GHB EST-MCNC: 126 MG/DL
GLOBULIN SER CALC-MCNC: 3.1 G/DL (ref 1.5–4.5)
GLUCOSE SERPL-MCNC: 84 MG/DL (ref 65–99)
HBA1C MFR BLD: 6 % (ref 4.8–5.6)
HCT VFR BLD AUTO: 36.6 % (ref 34–46.6)
HGB BLD-MCNC: 11.5 G/DL (ref 11.1–15.9)
MCH RBC QN AUTO: 25 PG (ref 26.6–33)
MCHC RBC AUTO-ENTMCNC: 31.4 G/DL (ref 31.5–35.7)
MCV RBC AUTO: 80 FL (ref 79–97)
PLATELET # BLD AUTO: 279 X10E3/UL (ref 150–379)
POTASSIUM SERPL-SCNC: 3.6 MMOL/L (ref 3.5–5.2)
PROT SERPL-MCNC: 7.3 G/DL (ref 6–8.5)
RBC # BLD AUTO: 4.6 X10E6/UL (ref 3.77–5.28)
SODIUM SERPL-SCNC: 140 MMOL/L (ref 134–144)
WBC # BLD AUTO: 6.6 X10E3/UL (ref 3.4–10.8)

## 2018-11-05 ENCOUNTER — OFFICE VISIT (OUTPATIENT)
Dept: SLEEP MEDICINE | Age: 53
End: 2018-11-05

## 2018-11-05 VITALS
DIASTOLIC BLOOD PRESSURE: 86 MMHG | BODY MASS INDEX: 39.11 KG/M2 | OXYGEN SATURATION: 97 % | HEIGHT: 63 IN | WEIGHT: 220.7 LBS | SYSTOLIC BLOOD PRESSURE: 123 MMHG | HEART RATE: 77 BPM

## 2018-11-05 DIAGNOSIS — G47.33 OSA (OBSTRUCTIVE SLEEP APNEA): Primary | ICD-10-CM

## 2018-11-05 DIAGNOSIS — G25.81 RLS (RESTLESS LEGS SYNDROME): ICD-10-CM

## 2018-11-05 DIAGNOSIS — E66.2 HYPOVENTILATION ASSOCIATED WITH OBESITY (HCC): ICD-10-CM

## 2018-11-05 DIAGNOSIS — I10 ESSENTIAL HYPERTENSION: ICD-10-CM

## 2018-11-05 DIAGNOSIS — E83.10 DISORDER OF IRON METABOLISM: ICD-10-CM

## 2018-11-05 RX ORDER — BUTALBITAL, ACETAMINOPHEN AND CAFFEINE 50; 325; 40 MG/1; MG/1; MG/1
TABLET ORAL
COMMUNITY
Start: 2018-10-03

## 2018-11-05 RX ORDER — KETOTIFEN FUMARATE 0.35 MG/ML
1 SOLUTION/ DROPS OPHTHALMIC 2 TIMES DAILY
COMMUNITY

## 2018-11-05 NOTE — PROGRESS NOTES
217 [facility] [place]. 1668 [facility], [address] Tel.  [phone] Fax. 100 [facility] 60 [place], 200 [address] [facility] Tel.  [phone] Fax. [phone] [address] [place] [employer] Yanique Padgett  Tel.  [phone] Fax. [phone] Subjective: Laura Vasquez is an 47 y.o. female referred for evaluation for a sleep disorder. She complains of snoring associated with excessive daytime sleepiness, awakening in the middle of the night because of headaches. Symptoms began several years ago, unchanged since that time. She usually can fall asleep in 30 minutes. Family or house members note snoring. She denies completely or partially paralyzed while falling asleep or waking up. Laura Vasquez does wake up frequently at night. She is bothered by waking up too early and left unable to get back to sleep. She actually sleeps about 4 hours at night and wakes up about 3 times during the night. She does not work shifts: [facility] indicates she does get too little sleep at night. Her bedtime is 2330. She awakens at 36. She does not take naps. She has the following observed behaviors: Loud snoring, Light snoring;  . Other remarks:   She reports of an urge to move extremities due to sensation of something crawling on feet and denies of dream enactment behavior. [place] Sleepiness Score: 15 which reflect severe daytime drowsiness. CO2 28   20 - 29 mmol/L Allergies Allergen Reactions  Latex Itching  Atorvastatin Myalgia  Bacitracin-Polymyxin B Swelling Eye swelling  Crestor [Rosuvastatin] Myalgia  Statins-Hmg-Coa Reductase Inhibitors Myalgia Current Outpatient Medications:  
  ketotifen (ZADITOR) 0.025 % (0.035 %) ophthalmic solution, Administer 1 Drop to both eyes two (2) times a day., Disp: , Rfl:  
  butalbital-acetaminophen-caffeine (FIORICET, ESGIC) -40 mg per tablet, , Disp: , Rfl:  
   hydroCHLOROthiazide (HYDRODIURIL) 25 mg tablet, TAKE ONE TABLET BY MOUTH DAILY, Disp: 90 Tab, Rfl: 1 
  aspirin-acetaminophen-caffeine (EXCEDRIN ES) 250-250-65 mg per tablet, Take 1 Tab by mouth., Disp: , Rfl:  
  Lactobac 42-Bifid 5-gbbnlk-HVV (PROBIOTIC PLUS COLOSTRUM) -50 mg pwpk, Take  by mouth., Disp: , Rfl:   
 
She  has a past medical history of Hypertension. She  has a past surgical history that includes hx gyn. She family history includes Diabetes in her mother and sister; Heart Disease in her father and sister; Stroke in her brother and sister. She  reports that  has never smoked. she has never used smokeless tobacco. She reports that she does not drink alcohol or use drugs. Review of Systems: 
Constitutional:  No significant weight loss or weight gain Eyes:  No blurred vision CVS:  No significant chest pain Pulm:  No significant shortness of breath GI:  No significant nausea or vomiting :  No significant nocturia Musculoskeletal:  No significant joint pain at night Skin:  No significant rashes Neuro:  No significant dizziness Psych:  No active mood issues Sleep Review of Systems: notable for  difficulty falling asleep; frequent awakenings at night;  regular dreaming noted; no nightmares ; no early morning headaches; no memory problems; no concentration issues; no history of any automobile or occupational accidents due to daytime drowsiness. Objective:  
 
Visit Vitals /86 Pulse 77 Ht 5' 2.5\" (1.588 m) Wt 220 lb 11.2 oz (100.1 kg) SpO2 97% BMI 39.72 kg/m² General:   Not in acute distress Eyes:  Anicteric sclerae, no obvious strabismus Nose:  No obvious nasal septum deviation Oropharynx:   Class 4 oropharyngeal outlet, thick tongue base, uvula could not be seen due to low-lying soft palate, narrow tonsilo-pharyngeal pilars Tonsils:   tonsils are not seen due to low-lying soft palate Neck:   Neck circ. in \"inches\": 13.26; midline trachea Chest/Lungs:  Equal lung expansion, clear on auscultation CVS:  Normal rate, regular rhythm; no JVD Skin:  Warm to touch; no obvious rashes Neuro:  No focal deficits ; no obvious tremor Psych:  Normal affect,  normal countenance;    
  
 
Assessment: ICD-10-CM ICD-9-CM 1. TALHA (obstructive sleep apnea) G47.33 327.23 POLYSOMNOGRAPHY 1 NIGHT 2. Hypoventilation associated with obesity (HCC) E66.2 278.03   
3. RLS (restless legs syndrome) G25.81 333.94   
4. 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 Essential hypertension I10 401.9 6. BMI 39.0-39.9,adult Z68.39 V85.39 Plan: * The patient currently has a Moderate Risk for having sleep apnea. STOP-BANG score 5. 
* Sleep testing was ordered for initial evaluation. Orders Placed This Encounter  FERRITIN  
 POLYSOMNOGRAPHY 1 NIGHT Standing Status:   Future Standing Expiration Date:   5/5/2019 Scheduling Instructions:  
   Perform ETCO2 monitoring during Polysomnography - Do not split. Order Specific Question:   Reason for Exam  
  Answer:   TALHA / OHS * She was provided information on sleep apnea / obesity hypoventilation including coresponding risk factors and the importance of proper treatment. * Treatment options if indicated were reviewed today. Patient agrees to a trial of OAT / PAP therapy if indicated. * Counseling was provided regarding proper sleep hygiene (including effect of light on sleep), paradoxical intention, stimulus control, sleep environment safety and safe driving. * Effect of sleep disturbance on weight was reviewed. We have recommended a dedicated weight loss through appropriate diet and an exercise regiment as significant weight reduction has been shown to reduce severity of obstructive sleep apnea.   
 
* Patient agrees to telephone [phone]  follow-up by myself or lead sleep technologist shortly after sleep study to review results and plan final management. 
  
(patient has given permission for a message to be left regarding test results and further management if patient cannot be cannot be reached directly). Thank you for allowing us to participate in your patient's medical care. We'll keep you updated on these investigations. Felix Jimenez MD, Rosmery Hyde Electronically signed. 11/05/18

## 2018-11-05 NOTE — PATIENT INSTRUCTIONS
217 [facility] [place]. 1668 [facility], [address] Tel.  [phone] Fax. 100 [facility] 60 [place], 200 [address] [facility] Tel.  [phone] Fax. [phone] [address] [employer] Yanique Padgett Tel.  [phone] Fax. [phone] Sleep Apnea: After Your Visit Your Care Instructions Sleep apnea occurs when you frequently stop breathing for 10 seconds or longer during sleep. It can be mild to severe, based on the number of times per hour that you stop breathing or have slowed breathing. Blocked or narrowed airways in your nose, mouth, or throat can cause sleep apnea. Your airway can become blocked when your throat muscles and tongue relax during sleep. Sleep apnea is common, occurring in 1 out of 20 individuals. Individuals having any of the following characteristics should be evaluated and treated right away due to high risk and detrimental consequences from untreated sleep apnea: 
1. Obesity 2. Congestive Heart failure 3. Atrial Fibrillation 4. Uncontrolled Hypertension 5. Type II Diabetes 6. Night-time Arrhythmias 7. Stroke 8. Pulmonary Hypertension 9. High-risk Driving Populations (pilots, truck drivers, etc.) 10. Patients Considering Weight-loss Surgery How do you know you have sleep apnea? You probably have sleep apnea if you answer 'yes' to 3 or more of the following questions: S - Have you been told that you Snore? T - Are you often Tired during the day? O - Has anyone Observed you stop breathing while sleeping? P- Do you have (or are being treated for) high blood Pressure? B - Are you obese (Body Mass Index > 35)? A - Is your Age 48years old or older? N - Is your Neck size greater than 16 inches? G - Are you male Gender? A sleep physician can prescribe a breathing device that prevents tissues in the throat from blocking your airway.  Or your doctor may recommend using a dental device (oral breathing device) to help keep your airway open. In some cases, surgery may be needed to remove enlarged tissues in the throat. Follow-up care is a key part of your treatment and safety. Be sure to make and go to all appointments, and call your doctor if you are having problems. It's also a good idea to know your test results and keep a list of the medicines you take. How can you care for yourself at home? · Lose weight, if needed. It may reduce the number of times you stop breathing or have slowed breathing. · Go to bed at the same time every night. · Sleep on your side. It may stop mild apnea. If you tend to roll onto your back, sew a pocket in the back of your pajama top. Put a tennis ball into the pocket, and stitch the pocket shut. This will help keep you from sleeping on your back. · Avoid alcohol and medicines such as sleeping pills and sedatives before bed. · Do not smoke. Smoking can make sleep apnea worse. If you need help quitting, talk to your doctor about stop-smoking programs and medicines. These can increase your chances of quitting for good. · Prop up the head of your bed 4 to 6 inches by putting bricks under the legs of the bed. · Treat breathing problems, such as a stuffy nose, caused by a cold or allergies. · Use a continuous positive airway pressure (CPAP) breathing machine if lifestyle changes do not help your apnea and your doctor recommends it. The machine keeps your airway from closing when you sleep. · If CPAP does not help you, ask your doctor whether you should try other breathing machines. A bilevel positive airway pressure machine has two types of air pressureâone for breathing in and one for breathing out. Another device raises or lowers air pressure as needed while you breathe. · If your nose feels dry or bleeds when using one of these machines, talk with your doctor about increasing moisture in the air. A humidifier may help.  
· If your nose is runny or stuffy from using a breathing machine, talk with your doctor about using decongestants or a corticosteroid nasal spray. When should you call for help? Watch closely for changes in your health, and be sure to contact your doctor if: 
· You still have sleep apnea even though you have made lifestyle changes. · You are thinking of trying a device such as CPAP. · You are having problems using a CPAP or similar machine. Where can you learn more? Go to [facility]. Enter O363 in the search box to learn more about \"Sleep Apnea: After Your Visit. \"  
© 1681-9774 Healthwise, Incorporated. Care instructions adapted under license by [employer] (which disclaims liability or warranty for this information). This care instruction is for use with your licensed healthcare professional. If you have questions about a medical condition or this instruction, always ask your healthcare professional. Sammie Hawkins any warranty or liability for your use of this information. PROPER SLEEP HYGIENE What to avoid · Do not have drinks with caffeine, such as coffee or black tea, for 8 hours before bed. · Do not smoke or use other types of tobacco near bedtime. Nicotine is a stimulant and can keep you awake. · Avoid drinking alcohol late in the evening, because it can cause you to wake in the middle of the night. · Do not eat a big meal close to bedtime. If you are hungry, eat a light snack. · Do not drink a lot of water close to bedtime, because the need to urinate may wake you up during the night. · Do not read or watch TV in bed. Use the bed only for sleeping and sexual activity. What to try · Go to bed at the same time every night, and wake up at the same time every morning. Do not take naps during the day. · Keep your bedroom quiet, dark, and cool. · Get regular exercise, but not within 3 to 4 hours of your bedtime. Amalia Guaman · Sleep on a comfortable pillow and mattress. · If watching the clock makes you anxious, turn it facing away from you so you cannot see the time. · If you worry when you lie down, start a worry book. Well before bedtime, write down your worries, and then set the book and your concerns aside. · Try meditation or other relaxation techniques before you go to bed. · If you cannot fall asleep, get up and go to another room until you feel sleepy. Do something relaxing. Repeat your bedtime routine before you go to bed again. · Make your house quiet and calm about an hour before bedtime. Turn down the lights, turn off the TV, log off the computer, and turn down the volume on music. This can help you relax after a busy day. Drowsy Driving The [employer] cites drowsiness as a causing factor in more than 010,293 police reported crashes annually, resulting in 76,000 injuries and 1,500 deaths. Other surveys suggest 55% of people polled have driven while drowsy in the past year, 23% had fallen asleep but not crashed, 3% crashed, and 2% had and accident due to drowsy driving. Who is at risk? Young Drivers: One study of drowsy driving accidents states that 55% of the drivers were under 25 years. Of those, 75% were male. Shift Workers and Travelers: People who work overnight or travel across time zones frequently are at higher risk of experiencing Circadian Rhythm Disorders. They are trying to work and function when their body is programed to sleep. Sleep Deprived: Lack of sleep has a serious impact on your ability to pay attention or focus on a task. Consistently getting less than the average of 8 hours your body needs creates partial or cumulative sleep deprivation. Untreated Sleep Disorders: Sleep Apnea, Narcolepsy, R.L.S., and other sleep disorders (untreated) prevent a person from getting enough restful sleep.  This leads to excessive daytime sleepiness and increases the risk for drowsy driving accidents by up to 7 times. Medications / Alcohol: Even over the counter medications can cause drowsiness. Medications that impair a drivers attention should have a warning label. Alcohol naturally makes you sleepy and on its own can cause accidents. Combined with excessive drowsiness its effects are amplified. Signs of Drowsy Driving: * You don't remember driving the last few miles * You may drift out of your shaji * You are unable to focus and your thoughts wander * You may yawn more often than normal 
 * You have difficulty keeping your eyes open / nodding off * Missing traffic signs, speeding, or tailgating Prevention-  
Good sleep hygiene, lifestyle and behavioral choices have the most impact on drowsy driving. There is no substitute for sleep and the average person requires 8 hours nightly. If you find yourself driving drowsy, stop and sleep. Consider the sleep hygiene tips provided during your visit as well. Medication Refill Policy: Refills for all medications require 1 week advance notice. Please have your pharmacy fax a refill request. We are unable to fax, or call in \"controled substance\" medications and you will need to pick these prescriptions up from our office. [employer] Activation Thank you for requesting access to [employer]. Please follow the instructions below to securely access and download your online medical record. [employer] allows you to send messages to your doctor, view your test results, renew your prescriptions, schedule appointments, and more. How Do I Sign Up? 1. In your internet browser, go to [email or web address]. 2. Click on the First Time User? Click Here link in the Sign In box. You will see the New Member Sign Up page. 3. Enter your Appography Access Code exactly as it appears below. You will not need to use this code after youve completed the sign-up process.  If you do not sign up before the expiration date, you must request a new code. Shuame Access Code: Activation code not generated Current Shuame Status: Active (This is the date your Shuame access code will ) 4. Enter the last four digits of your Social Security Number (xxxx) and Date of Birth (mm/dd/yyyy) as indicated and click Submit. You will be taken to the next sign-up page. 5. Create a Hector Beveragest ID. This will be your Shuame login ID and cannot be changed, so think of one that is secure and easy to remember. 6. Create a Shuame password. You can change your password at any time. 7. Enter your Password Reset Question and Answer. This can be used at a later time if you forget your password. 8. Enter your e-mail address. You will receive e-mail notification when new information is available in [address] 9. Click Sign Up. You can now view and download portions of your medical record. 10. Click the Download Summary menu link to download a portable copy of your medical information. Additional Information If you have questions, please call [phone]. Remember, Shuame is NOT to be used for urgent needs. For medical emergencies, dial 911.

## 2018-11-06 LAB — FERRITIN SERPL-MCNC: 46 NG/ML (ref 15–150)

## 2018-11-12 ENCOUNTER — OFFICE VISIT (OUTPATIENT)
Dept: NEUROLOGY | Age: 53
End: 2018-11-12

## 2018-11-12 VITALS — DIASTOLIC BLOOD PRESSURE: 76 MMHG | BODY MASS INDEX: 39.6 KG/M2 | SYSTOLIC BLOOD PRESSURE: 120 MMHG | WEIGHT: 220 LBS

## 2018-11-12 DIAGNOSIS — G43.111 INTRACTABLE MIGRAINE WITH AURA WITH STATUS MIGRAINOSUS: Primary | ICD-10-CM

## 2018-11-12 NOTE — LETTER
11/12/2018 2:36 PM 
 
Patient: Pratibha Ventura YOB: 1965 Date of Visit: 11/12/2018 Dear Antonia Cano MD 
[address] [facility] 151 [facility] 99 [id number] VIA In Basket 
 : Thank you for referring Ms. Lawyer Mcadams to me for evaluation/treatment. Below are the relevant portions of my assessment and plan of care. If you have questions, please do not hesitate to call me. I look forward to following Ms. Daniel Tarango along with you. Sincerely, Vera Michaud MD

## 2018-11-12 NOTE — PROGRESS NOTES
NEUROLOGY NEW PATIENT OFFICE CONSULTATION      11/12/2018    RE: Kaushik Floyd         1965      REFERRED BY:  Katerin Gagnon DO        CHIEF COMPLAINT:  This is Kaushik Floyd is a 47 y.o. female left handed [address] [facility] who had concerns including Migraine. HPI:     Since teen age yrs, patient has been having headaches, bitemporal, 10/10, lasting 2-3 days, throbbing,occuring almost daily since they went to [place] [address] (prviously only gets 1/6 months), triggered by stress, lack of sleep, (+) nausea (-) vomiting (+) photophobia (-) phonophobia (+) blurred vision. Patient saw Dr Maldonado Ricketts in 2015 and was placed on Imitrex 100 mg  but made headache worse. Tried Excedrin and Fioricet. Was on Topamax but unsure if it helped.     ROS   (-) fever  (-) rash  All other systems reviewed and are negative    Past Medical Hx  Past Medical History:   Diagnosis Date    Hypertension        Social Hx  Social History     Socioeconomic History    Marital status: SINGLE     Spouse name: Not on file    Number of children: Not on file    Years of education: Not on file    Highest education level: Not on file   Social Needs    Financial resource strain: Not on file    Food insecurity - worry: Not on file    Food insecurity - inability: Not on file   [employer] needs - medical: Not on file   [employer] needs - non-medical: Not on file   Occupational History    Not on file   Tobacco Use    Smoking status: Never Smoker    Smokeless tobacco: Never Used   Substance and Sexual Activity    Alcohol use: No     Alcohol/week: 0.0 oz    Drug use: No    Sexual activity: Not Currently   Other Topics Concern    Not on file   Social History Narrative    Not on file       Family Hx  Family History   Problem Relation Age of Onset    Diabetes Mother     Heart Disease Father     Diabetes Sister     Heart Disease Sister     Stroke Sister     Stroke Brother        ALLERGIES  Allergies Allergen Reactions    Latex Itching    Atorvastatin Myalgia    Bacitracin-Polymyxin B Swelling     Eye swelling    Crestor [Rosuvastatin] Myalgia    Statins-Hmg-Coa Reductase Inhibitors Myalgia       CURRENT MEDS  Current Outpatient Medications   Medication Sig Dispense Refill    ketotifen (ZADITOR) 0.025 % (0.035 %) ophthalmic solution Administer 1 Drop to both eyes two (2) times a day.  hydroCHLOROthiazide (HYDRODIURIL) 25 mg tablet TAKE ONE TABLET BY MOUTH DAILY 90 Tab 1    Lactobac 42-Bifid 9-bufssi-CAJ (PROBIOTIC PLUS COLOSTRUM) -50 mg pwpk Take  by mouth.  butalbital-acetaminophen-caffeine (FIORICET, ESGIC) -40 mg per tablet       aspirin-acetaminophen-caffeine (EXCEDRIN ES) 250-250-65 mg per tablet Take 1 Tab by mouth. PREVIOUS WORKUP: (reviewed)  IMAGING:    CT Results (recent):  No results found for this or any previous visit. MRI Results (recent):  Results from [place] encounter on 02/02/17   MRI LUMB SPINE WO CONT    Narrative MRI OF THE LUMBAR SPINE WITHOUT CONTRAST: 2/2/2017 5:46 PM    INDICATION:  Radiculopathy    TECHNIQUE: Multiplanar and multisequence MRI was obtained of the lumbar spine  without contrast.    COMPARISON: Lumbar radiographs 12/22/2016. FINDINGS:   Lumbar alignment is normal. Bone marrow signal intensity is normal. Vertebral  body heights are preserved. The visualized cord is normal in caliber and signal  intensity. The conus medullaris terminates at the L1 level. The paraspinal soft  tissues are normal.     L1-L2: No herniation or stenosis. L2-L3: No herniation or stenosis. Incidental note is made of a tiny left nerve  root sleeve cyst (8-4). L3-L4: A small disc bulge and facet osteoarthritis cause moderate canal  stenosis, left neural foraminal stenosis, and mild right neural foraminal  stenosis. L4-L5: Facet osteoarthritis causes mild bilateral neural foraminal stenosis. L5-S1: No herniation or stenosis.  Facet osteoarthritis. Impression IMPRESSION: Moderate canal stenosis, left neural foraminal stenosis, and mild  right neural foraminal stenosis at L3-L4. IR Results (recent):  No results found for this or any previous visit. VAS/US Results (recent):  No results found for this or any previous visit. LABS (reviewed)  Results for orders placed or performed in visit on 11/05/18   FERRITIN   Result Value Ref Range    Ferritin 46 15 - 150 ng/mL       Physical Exam:     Visit Vitals  /76   Wt 99.8 kg (220 lb)   BMI 39.60 kg/m²     General:  Alert, cooperative, no distress. Head:  Normocephalic, without obvious abnormality, atraumatic. Eyes:  Conjunctivae/corneas clear. Lungs:  Heart:   Non labored breathing  Regular rate and rhythm, no carotid bruits   Abdomen:   Soft, non-distended   Extremities: Extremities normal, atraumatic, no cyanosis or edema. Pulses: 2+ and symmetric all extremities. Skin: Skin color, texture, turgor normal. No rashes or lesions.   Neurologic Exam     Gen: Attention normal             Language: naming, repetition, fluency normal             Memory: intact recent and remote memory  Cranial Nerves:  I: smell Not tested   II: visual fields Full to confrontation   II: pupils Equal, round, reactive to light   II: optic disc No papilledema   III,VII: ptosis none   III,IV,VI: extraocular muscles  Full ROM   V: mastication normal   V: facial light touch sensation  normal   VII: facial muscle function   symmetric   VIII: hearing symmetric   IX: soft palate elevation  normal   XI: trapezius strength  5/5   XI: sternocleidomastoid strength 5/5   XI: neck flexion strength  5/5   XII: tongue  midline     Motor: normal bulk and tone, no tremor              Strength: 5/5 all four extremities  Sensory: intact to LT, PP, vibration, and JPS  Reflexes: 2+ throughout; Down going toes  Coordination: Good FTN and HTS  Gait: normal gait including tandem            Impression:     Amor Alvarado Rafael Garrido is a 47 y.o. female who  has a past medical history of Hypertension. who since teen age yrs, has been having headaches, bitemporal, 10/10, lasting 2-3 days, throbbing,occuring almost daily since they went to [place] [address] (prviously only gets 1/6 months), triggered by stress, lack of sleep, (+) nausea (-) vomiting (+) photophobia (-) phonophobia (+) blurred vision. Patient saw Dr Arnie Alfred in 2015 and was placed on Imitrex 100 mg  but made headache worse. Tried Excedrin and Fioricet. Was on Topamax but unsure if it helped. Consideration includes migraine with visual auras, intractable. Patient may have superimposed chronic analgesic headache (i.e. Excedrin)    RECOMMENDATIONS  1. Since patient has no headache for 1 week, will observe symptoms for now  2. Given samples of Cambia and Zipsor to try to abort headaches  3. Discussed the need for headache diary and went through the list that can trigger headache (i.e. Stress, chocolate, artificial sweeteners)  4. Patient already stopped Excedrin  5. Will consider doing MRI brain and and Topamax if no improvement despite above    Follow-up Disposition:  Return if symptoms worsen or fail to improve.       Thank you for the consultation      Charlene Cline MD  Diplomate, American Board of Psychiatry and Neurology  Diplomate, Neuromuscular Medicine  Diplomate, American Board of Electrodiagnostic Medicine        CC: Reyna Saldana [place]  Fax: [phone]

## 2018-12-21 ENCOUNTER — HOSPITAL ENCOUNTER (OUTPATIENT)
Dept: SLEEP MEDICINE | Age: 53
Discharge: HOME | End: 2018-12-21
Payer: COMMERCIAL

## 2018-12-21 DIAGNOSIS — G47.33 OSA (OBSTRUCTIVE SLEEP APNEA): ICD-10-CM

## 2018-12-21 PROCEDURE — 95810 POLYSOM 6/> YRS 4/> PARAM: CPT | Performed by: INTERNAL MEDICINE

## 2019-01-11 ENCOUNTER — TELEPHONE (OUTPATIENT)
Dept: SLEEP MEDICINE | Age: 54
End: 2019-01-11

## 2019-01-11 DIAGNOSIS — G47.33 OSA (OBSTRUCTIVE SLEEP APNEA): Primary | ICD-10-CM

## 2019-01-11 NOTE — TELEPHONE ENCOUNTER
Everardo Polanco is to be contacted by lead sleep technologist regarding results of Sleep Testing which was indicative of an average AHI of 6.2 per hour with an SpO2 ja of 85% and SpO2 of < 88% being 1.0 minutes. An APAP prescription has been written and patient will be contacted by office staff regarding follow-up  in 2-3 months after initiation of therapy. Encounter Diagnosis   Name Primary?  TALHA (obstructive sleep apnea) Yes       Orders Placed This Encounter    AMB SUPPLY ORDER     Diagnosis: (G47.33) TALHA (obstructive sleep apnea)  (primary encounter diagnosis)     Positive Airway Pressure Therapy: Duration of need: 99 months. Respironics DreamStation ( Unit - Auto set Mode): Auto - PAP: 4 - 20 cmH2O; Optistart enabled. Ramp Time: 30 Minutes; Flex: 2. Remote monitoring enrollment.  Heated Humidifier     Oral/Nasal Combo Mask 1 every 3 months.  Oral Cushion Combo Mask (Replace) 2 per month.  Nasal Pillows Combo Mask (Replace) 2 per month.  Full Face Mask 1 every 3 months.  Full Face Mask Cushion 1 per month.  Nasal Cushion (Replace) 2 per month.  Nasal Pillows (Replace) 2 per month.  Nasal Interface Mask 1 every 3 months.  Headgear 1 every 6 months.  Chinstrap 1 every 6 months.  Tubing 1 every 3 months.  Filter(s) Disposable 2 per month.  Filter(s) Non-Disposable 1 every 6 months.  Oral Interface 1 every 3 months. [address] for Locked [place] (Replace) 1 every 6 months.  Tubing with heating element 1 every 3 months. Perform Mask Fitting per patient preference and comfort - replace as above. Elston Simmonds, MD, FAASM; NPI: [id number]  Electronically signed. 01/11/19

## 2019-01-15 ENCOUNTER — PATIENT MESSAGE (OUTPATIENT)
Dept: SLEEP MEDICINE | Age: 54
End: 2019-01-15

## 2019-01-16 ENCOUNTER — DOCUMENTATION ONLY (OUTPATIENT)
Dept: SLEEP MEDICINE | Age: 54
End: 2019-01-16

## 2019-01-16 NOTE — TELEPHONE ENCOUNTER
Read out 's sleep study interpretation to patient. She verbalized understanding. She wanted me to fax the PAP order to DME.

## 2019-01-18 ENCOUNTER — DOCUMENTATION ONLY (OUTPATIENT)
Dept: SLEEP MEDICINE | Age: 54
End: 2019-01-18

## 2019-01-18 PROBLEM — G47.33 OSA (OBSTRUCTIVE SLEEP APNEA): Status: ACTIVE | Noted: 2019-01-18

## 2019-01-18 NOTE — PROGRESS NOTES
This note is being routed to Dr. Nena Figueroa. Sleep Medicine consult note and sleep study report in patient's chart for review.     Thank you for the referral.

## 2019-04-15 ENCOUNTER — DOCUMENTATION ONLY (OUTPATIENT)
Dept: SLEEP MEDICINE | Age: 54
End: 2019-04-15

## 2019-07-05 DIAGNOSIS — I10 ESSENTIAL HYPERTENSION: ICD-10-CM

## 2019-07-05 RX ORDER — HYDROCHLOROTHIAZIDE 25 MG/1
TABLET ORAL
Qty: 90 TAB | Refills: 1 | Status: SHIPPED | OUTPATIENT
Start: 2019-07-05 | End: 2019-10-17 | Stop reason: SDUPTHER

## 2019-10-17 DIAGNOSIS — I10 ESSENTIAL HYPERTENSION: ICD-10-CM

## 2019-10-17 NOTE — TELEPHONE ENCOUNTER
Requested Prescriptions     Pending Prescriptions Disp Refills    hydroCHLOROthiazide (HYDRODIURIL) 25 mg tablet 90 Tab 1     Requesting a 90 day supply.

## 2019-10-22 RX ORDER — HYDROCHLOROTHIAZIDE 25 MG/1
TABLET ORAL
Qty: 90 TAB | Refills: 0 | Status: SHIPPED | OUTPATIENT
Start: 2019-10-22 | End: 2020-04-20

## 2019-10-22 NOTE — TELEPHONE ENCOUNTER
Patient will be due for 1 year follow up in November. Will approve 3 month supply of medication but needs visit for future refills.

## 2019-10-23 NOTE — TELEPHONE ENCOUNTER
Spoke with the patient and relayed message medication was approved. She said she does not need any appointment at this time for a blood pressure check. In January she will have a new insurance as her present insurance expires in November.

## 2020-04-19 DIAGNOSIS — I10 ESSENTIAL HYPERTENSION: ICD-10-CM

## 2020-04-20 RX ORDER — HYDROCHLOROTHIAZIDE 25 MG/1
TABLET ORAL
Qty: 90 TAB | Refills: 0 | Status: SHIPPED | OUTPATIENT
Start: 2020-04-20

## 2022-03-19 PROBLEM — G47.33 OSA (OBSTRUCTIVE SLEEP APNEA): Status: ACTIVE | Noted: 2019-01-18

## 2023-05-25 RX ORDER — HYDROCHLOROTHIAZIDE 25 MG/1
1 TABLET ORAL DAILY
COMMUNITY
Start: 2020-04-20

## 2023-05-25 RX ORDER — KETOTIFEN FUMARATE 0.35 MG/ML
1 SOLUTION/ DROPS OPHTHALMIC 2 TIMES DAILY
COMMUNITY

## 2023-05-25 RX ORDER — ACETAMINOPHEN, ASPIRIN AND CAFFEINE 250; 250; 65 MG/1; MG/1; MG/1
1 TABLET, FILM COATED ORAL
COMMUNITY

## 2023-05-25 RX ORDER — BUTALBITAL, ACETAMINOPHEN AND CAFFEINE 50; 325; 40 MG/1; MG/1; MG/1
TABLET ORAL
COMMUNITY
Start: 2018-10-03